# Patient Record
Sex: FEMALE | Race: WHITE | NOT HISPANIC OR LATINO | URBAN - METROPOLITAN AREA
[De-identification: names, ages, dates, MRNs, and addresses within clinical notes are randomized per-mention and may not be internally consistent; named-entity substitution may affect disease eponyms.]

---

## 2018-02-09 ENCOUNTER — NEW PATIENT (OUTPATIENT)
Dept: URBAN - METROPOLITAN AREA CLINIC 24 | Facility: CLINIC | Age: 73
End: 2018-02-09
Payer: MEDICARE

## 2018-02-09 DIAGNOSIS — H40.013 OPEN ANGLE WITH BORDERLINE FINDINGS, LOW RISK, BILATERAL: ICD-10-CM

## 2018-02-09 DIAGNOSIS — H25.813 COMBINED FORMS OF AGE-RELATED CATARACT, BILATERAL: ICD-10-CM

## 2018-02-09 DIAGNOSIS — H40.053 OCULAR HYPERTENSION, BILATERAL: Primary | ICD-10-CM

## 2018-02-09 PROCEDURE — 92083 EXTENDED VISUAL FIELD XM: CPT | Performed by: OPTOMETRIST

## 2018-02-09 PROCEDURE — 92133 CPTRZD OPH DX IMG PST SGM ON: CPT | Performed by: OPTOMETRIST

## 2018-02-09 PROCEDURE — 76514 ECHO EXAM OF EYE THICKNESS: CPT | Performed by: OPTOMETRIST

## 2018-02-09 PROCEDURE — 92004 COMPRE OPH EXAM NEW PT 1/>: CPT | Performed by: OPTOMETRIST

## 2018-02-09 ASSESSMENT — INTRAOCULAR PRESSURE
OS: 16
OD: 16

## 2018-02-09 ASSESSMENT — VISUAL ACUITY
OD: 20/25
OS: 20/20

## 2018-02-09 ASSESSMENT — KERATOMETRY
OS: 43.75
OD: 43.66

## 2018-03-12 ENCOUNTER — Encounter (OUTPATIENT)
Dept: URBAN - METROPOLITAN AREA CLINIC 24 | Facility: CLINIC | Age: 73
End: 2018-03-12
Payer: MEDICARE

## 2018-03-12 DIAGNOSIS — Z01.818 ENCOUNTER FOR OTHER PREPROCEDURAL EXAMINATION: Primary | ICD-10-CM

## 2018-03-12 PROCEDURE — 99213 OFFICE O/P EST LOW 20 MIN: CPT | Performed by: NURSE PRACTITIONER

## 2018-03-12 PROCEDURE — 92025 CPTRIZED CORNEAL TOPOGRAPHY: CPT | Performed by: OPHTHALMOLOGY

## 2018-03-12 RX ORDER — OMEPRAZOLE 20 MG/1
20 MG CAPSULE, DELAYED RELEASE ORAL
Qty: 0 | Refills: 0 | Status: ACTIVE
Start: 2018-03-12

## 2018-03-12 RX ORDER — LISINOPRIL 10 MG/1
10 MG TABLET ORAL
Qty: 0 | Refills: 0 | Status: ACTIVE
Start: 2018-03-12

## 2018-03-12 RX ORDER — RIVAROXABAN 20 MG/1
20 MG TABLET, FILM COATED ORAL
Qty: 0 | Refills: 0 | Status: ACTIVE
Start: 2018-03-12

## 2018-03-21 ENCOUNTER — FOLLOW UP ESTABLISHED (OUTPATIENT)
Dept: URBAN - METROPOLITAN AREA CLINIC 24 | Facility: CLINIC | Age: 73
End: 2018-03-21
Payer: MEDICARE

## 2018-03-21 PROCEDURE — 92012 INTRM OPH EXAM EST PATIENT: CPT | Performed by: OPHTHALMOLOGY

## 2018-03-21 ASSESSMENT — INTRAOCULAR PRESSURE
OD: 18
OS: 16

## 2021-01-15 DIAGNOSIS — Z23 NEED FOR VACCINATION: ICD-10-CM

## 2022-06-10 PROBLEM — M24.571 EQUINUS CONTRACTURE OF RIGHT ANKLE: Status: ACTIVE | Noted: 2022-06-10

## 2022-07-25 ENCOUNTER — PRE-ADMISSION TESTING (OUTPATIENT)
Dept: ADMISSIONS | Facility: MEDICAL CENTER | Age: 77
End: 2022-07-25
Attending: ORTHOPAEDIC SURGERY
Payer: MEDICARE

## 2022-07-25 DIAGNOSIS — Z01.812 PRE-OPERATIVE LABORATORY EXAMINATION: ICD-10-CM

## 2022-07-25 DIAGNOSIS — Z01.810 PRE-OPERATIVE CARDIOVASCULAR EXAMINATION: ICD-10-CM

## 2022-07-25 LAB
BASOPHILS # BLD AUTO: 0.8 % (ref 0–1.8)
BASOPHILS # BLD: 0.05 K/UL (ref 0–0.12)
EKG IMPRESSION: NORMAL
EOSINOPHIL # BLD AUTO: 0.07 K/UL (ref 0–0.51)
EOSINOPHIL NFR BLD: 1.2 % (ref 0–6.9)
ERYTHROCYTE [DISTWIDTH] IN BLOOD BY AUTOMATED COUNT: 44.9 FL (ref 35.9–50)
HCT VFR BLD AUTO: 43.4 % (ref 37–47)
HGB BLD-MCNC: 14.3 G/DL (ref 12–16)
IMM GRANULOCYTES # BLD AUTO: 0.02 K/UL (ref 0–0.11)
IMM GRANULOCYTES NFR BLD AUTO: 0.3 % (ref 0–0.9)
LYMPHOCYTES # BLD AUTO: 1.76 K/UL (ref 1–4.8)
LYMPHOCYTES NFR BLD: 29 % (ref 22–41)
MCH RBC QN AUTO: 29.7 PG (ref 27–33)
MCHC RBC AUTO-ENTMCNC: 32.9 G/DL (ref 33.6–35)
MCV RBC AUTO: 90 FL (ref 81.4–97.8)
MONOCYTES # BLD AUTO: 0.53 K/UL (ref 0–0.85)
MONOCYTES NFR BLD AUTO: 8.7 % (ref 0–13.4)
NEUTROPHILS # BLD AUTO: 3.63 K/UL (ref 2–7.15)
NEUTROPHILS NFR BLD: 60 % (ref 44–72)
NRBC # BLD AUTO: 0 K/UL
NRBC BLD-RTO: 0 /100 WBC
PLATELET # BLD AUTO: 263 K/UL (ref 164–446)
PMV BLD AUTO: 9.5 FL (ref 9–12.9)
RBC # BLD AUTO: 4.82 M/UL (ref 4.2–5.4)
WBC # BLD AUTO: 6.1 K/UL (ref 4.8–10.8)

## 2022-07-25 PROCEDURE — 93010 ELECTROCARDIOGRAM REPORT: CPT | Performed by: INTERNAL MEDICINE

## 2022-07-25 PROCEDURE — 36415 COLL VENOUS BLD VENIPUNCTURE: CPT

## 2022-07-25 PROCEDURE — 85025 COMPLETE CBC W/AUTO DIFF WBC: CPT

## 2022-07-25 PROCEDURE — 93005 ELECTROCARDIOGRAM TRACING: CPT

## 2022-08-03 ENCOUNTER — ANESTHESIA EVENT (OUTPATIENT)
Dept: SURGERY | Facility: MEDICAL CENTER | Age: 77
End: 2022-08-03
Payer: MEDICARE

## 2022-08-03 ENCOUNTER — HOSPITAL ENCOUNTER (OUTPATIENT)
Facility: MEDICAL CENTER | Age: 77
End: 2022-08-03
Attending: ORTHOPAEDIC SURGERY | Admitting: ORTHOPAEDIC SURGERY
Payer: MEDICARE

## 2022-08-03 ENCOUNTER — ANESTHESIA (OUTPATIENT)
Dept: SURGERY | Facility: MEDICAL CENTER | Age: 77
End: 2022-08-03
Payer: MEDICARE

## 2022-08-03 VITALS
RESPIRATION RATE: 13 BRPM | BODY MASS INDEX: 24.71 KG/M2 | OXYGEN SATURATION: 94 % | HEIGHT: 62 IN | SYSTOLIC BLOOD PRESSURE: 160 MMHG | WEIGHT: 134.26 LBS | TEMPERATURE: 96.9 F | DIASTOLIC BLOOD PRESSURE: 73 MMHG | HEART RATE: 78 BPM

## 2022-08-03 DIAGNOSIS — M79.671 RIGHT FOOT PAIN: ICD-10-CM

## 2022-08-03 DIAGNOSIS — M24.571 EQUINUS CONTRACTURE OF RIGHT ANKLE: ICD-10-CM

## 2022-08-03 DIAGNOSIS — Z86.73 HISTORY OF STROKE: ICD-10-CM

## 2022-08-03 LAB
ANION GAP SERPL CALC-SCNC: 14 MMOL/L (ref 7–16)
BUN SERPL-MCNC: 13 MG/DL (ref 8–22)
CALCIUM SERPL-MCNC: 9.7 MG/DL (ref 8.5–10.5)
CHLORIDE SERPL-SCNC: 106 MMOL/L (ref 96–112)
CO2 SERPL-SCNC: 20 MMOL/L (ref 20–33)
CREAT SERPL-MCNC: 0.67 MG/DL (ref 0.5–1.4)
GFR SERPLBLD CREATININE-BSD FMLA CKD-EPI: 91 ML/MIN/1.73 M 2
GLUCOSE SERPL-MCNC: 103 MG/DL (ref 65–99)
POTASSIUM SERPL-SCNC: 3.8 MMOL/L (ref 3.6–5.5)
SODIUM SERPL-SCNC: 140 MMOL/L (ref 135–145)

## 2022-08-03 PROCEDURE — 8968 PR NO CHARGE - PROCEDURE: Mod: ASROC | Performed by: SPECIALIST/TECHNOLOGIST, OTHER

## 2022-08-03 PROCEDURE — 700111 HCHG RX REV CODE 636 W/ 250 OVERRIDE (IP): Performed by: ANESTHESIOLOGY

## 2022-08-03 PROCEDURE — 160028 HCHG SURGERY MINUTES - 1ST 30 MINS LEVEL 3: Performed by: ORTHOPAEDIC SURGERY

## 2022-08-03 PROCEDURE — 160046 HCHG PACU - 1ST 60 MINS PHASE II: Performed by: ORTHOPAEDIC SURGERY

## 2022-08-03 PROCEDURE — 80048 BASIC METABOLIC PNL TOTAL CA: CPT

## 2022-08-03 PROCEDURE — 99100 ANES PT EXTEME AGE<1 YR&>70: CPT | Performed by: ANESTHESIOLOGY

## 2022-08-03 PROCEDURE — 700102 HCHG RX REV CODE 250 W/ 637 OVERRIDE(OP): Performed by: ANESTHESIOLOGY

## 2022-08-03 PROCEDURE — 700105 HCHG RX REV CODE 258: Performed by: ANESTHESIOLOGY

## 2022-08-03 PROCEDURE — 160048 HCHG OR STATISTICAL LEVEL 1-5: Performed by: ORTHOPAEDIC SURGERY

## 2022-08-03 PROCEDURE — 160036 HCHG PACU - EA ADDL 30 MINS PHASE I: Performed by: ORTHOPAEDIC SURGERY

## 2022-08-03 PROCEDURE — 01470 ANES PX NRV MSC LW L/A/F NOS: CPT | Performed by: ANESTHESIOLOGY

## 2022-08-03 PROCEDURE — A9270 NON-COVERED ITEM OR SERVICE: HCPCS | Performed by: ANESTHESIOLOGY

## 2022-08-03 PROCEDURE — 160025 RECOVERY II MINUTES (STATS): Performed by: ORTHOPAEDIC SURGERY

## 2022-08-03 PROCEDURE — 700105 HCHG RX REV CODE 258: Performed by: ORTHOPAEDIC SURGERY

## 2022-08-03 PROCEDURE — 27685 REVISION OF LOWER LEG TENDON: CPT | Mod: RT | Performed by: ORTHOPAEDIC SURGERY

## 2022-08-03 PROCEDURE — 700101 HCHG RX REV CODE 250: Performed by: ORTHOPAEDIC SURGERY

## 2022-08-03 PROCEDURE — 160035 HCHG PACU - 1ST 60 MINS PHASE I: Performed by: ORTHOPAEDIC SURGERY

## 2022-08-03 PROCEDURE — 700101 HCHG RX REV CODE 250: Performed by: ANESTHESIOLOGY

## 2022-08-03 PROCEDURE — 160009 HCHG ANES TIME/MIN: Performed by: ORTHOPAEDIC SURGERY

## 2022-08-03 PROCEDURE — 160002 HCHG RECOVERY MINUTES (STAT): Performed by: ORTHOPAEDIC SURGERY

## 2022-08-03 RX ORDER — LIDOCAINE HYDROCHLORIDE 20 MG/ML
INJECTION, SOLUTION EPIDURAL; INFILTRATION; INTRACAUDAL; PERINEURAL PRN
Status: DISCONTINUED | OUTPATIENT
Start: 2022-08-03 | End: 2022-08-03 | Stop reason: SURG

## 2022-08-03 RX ORDER — CEFAZOLIN SODIUM 1 G/3ML
INJECTION, POWDER, FOR SOLUTION INTRAMUSCULAR; INTRAVENOUS PRN
Status: DISCONTINUED | OUTPATIENT
Start: 2022-08-03 | End: 2022-08-03 | Stop reason: SURG

## 2022-08-03 RX ORDER — ONDANSETRON 2 MG/ML
4 INJECTION INTRAMUSCULAR; INTRAVENOUS
Status: COMPLETED | OUTPATIENT
Start: 2022-08-03 | End: 2022-08-03

## 2022-08-03 RX ORDER — DEXAMETHASONE SODIUM PHOSPHATE 4 MG/ML
INJECTION, SOLUTION INTRA-ARTICULAR; INTRALESIONAL; INTRAMUSCULAR; INTRAVENOUS; SOFT TISSUE PRN
Status: DISCONTINUED | OUTPATIENT
Start: 2022-08-03 | End: 2022-08-03 | Stop reason: SURG

## 2022-08-03 RX ORDER — DIPHENHYDRAMINE HYDROCHLORIDE 50 MG/ML
6.25 INJECTION INTRAMUSCULAR; INTRAVENOUS
Status: DISCONTINUED | OUTPATIENT
Start: 2022-08-03 | End: 2022-08-03 | Stop reason: HOSPADM

## 2022-08-03 RX ORDER — HALOPERIDOL 5 MG/ML
1 INJECTION INTRAMUSCULAR
Status: DISCONTINUED | OUTPATIENT
Start: 2022-08-03 | End: 2022-08-03 | Stop reason: HOSPADM

## 2022-08-03 RX ORDER — HYDROMORPHONE HYDROCHLORIDE 1 MG/ML
0.2 INJECTION, SOLUTION INTRAMUSCULAR; INTRAVENOUS; SUBCUTANEOUS
Status: DISCONTINUED | OUTPATIENT
Start: 2022-08-03 | End: 2022-08-03 | Stop reason: HOSPADM

## 2022-08-03 RX ORDER — ALBUTEROL SULFATE 2.5 MG/3ML
2.5 SOLUTION RESPIRATORY (INHALATION)
Status: DISCONTINUED | OUTPATIENT
Start: 2022-08-03 | End: 2022-08-03 | Stop reason: HOSPADM

## 2022-08-03 RX ORDER — BUPIVACAINE HYDROCHLORIDE 5 MG/ML
INJECTION, SOLUTION EPIDURAL; INTRACAUDAL
Status: DISCONTINUED | OUTPATIENT
Start: 2022-08-03 | End: 2022-08-03 | Stop reason: HOSPADM

## 2022-08-03 RX ORDER — SODIUM CHLORIDE, SODIUM LACTATE, POTASSIUM CHLORIDE, CALCIUM CHLORIDE 600; 310; 30; 20 MG/100ML; MG/100ML; MG/100ML; MG/100ML
INJECTION, SOLUTION INTRAVENOUS CONTINUOUS
Status: DISCONTINUED | OUTPATIENT
Start: 2022-08-03 | End: 2022-08-03

## 2022-08-03 RX ORDER — OXYCODONE HCL 5 MG/5 ML
10 SOLUTION, ORAL ORAL
Status: COMPLETED | OUTPATIENT
Start: 2022-08-03 | End: 2022-08-03

## 2022-08-03 RX ORDER — ONDANSETRON 2 MG/ML
INJECTION INTRAMUSCULAR; INTRAVENOUS PRN
Status: DISCONTINUED | OUTPATIENT
Start: 2022-08-03 | End: 2022-08-03 | Stop reason: SURG

## 2022-08-03 RX ORDER — SODIUM CHLORIDE, SODIUM LACTATE, POTASSIUM CHLORIDE, CALCIUM CHLORIDE 600; 310; 30; 20 MG/100ML; MG/100ML; MG/100ML; MG/100ML
INJECTION, SOLUTION INTRAVENOUS CONTINUOUS
Status: DISCONTINUED | OUTPATIENT
Start: 2022-08-03 | End: 2022-08-03 | Stop reason: HOSPADM

## 2022-08-03 RX ORDER — CEFAZOLIN SODIUM 1 G/3ML
2 INJECTION, POWDER, FOR SOLUTION INTRAMUSCULAR; INTRAVENOUS ONCE
Status: DISCONTINUED | OUTPATIENT
Start: 2022-08-03 | End: 2022-08-03 | Stop reason: HOSPADM

## 2022-08-03 RX ORDER — OXYCODONE HCL 5 MG/5 ML
5 SOLUTION, ORAL ORAL
Status: COMPLETED | OUTPATIENT
Start: 2022-08-03 | End: 2022-08-03

## 2022-08-03 RX ORDER — HYDROMORPHONE HYDROCHLORIDE 1 MG/ML
0.4 INJECTION, SOLUTION INTRAMUSCULAR; INTRAVENOUS; SUBCUTANEOUS
Status: DISCONTINUED | OUTPATIENT
Start: 2022-08-03 | End: 2022-08-03 | Stop reason: HOSPADM

## 2022-08-03 RX ORDER — HYDRALAZINE HYDROCHLORIDE 20 MG/ML
5 INJECTION INTRAMUSCULAR; INTRAVENOUS
Status: DISCONTINUED | OUTPATIENT
Start: 2022-08-03 | End: 2022-08-03 | Stop reason: HOSPADM

## 2022-08-03 RX ORDER — HYDROMORPHONE HYDROCHLORIDE 1 MG/ML
0.1 INJECTION, SOLUTION INTRAMUSCULAR; INTRAVENOUS; SUBCUTANEOUS
Status: DISCONTINUED | OUTPATIENT
Start: 2022-08-03 | End: 2022-08-03 | Stop reason: HOSPADM

## 2022-08-03 RX ADMIN — ONDANSETRON HYDROCHLORIDE 4 MG: 2 SOLUTION INTRAMUSCULAR; INTRAVENOUS at 08:03

## 2022-08-03 RX ADMIN — DEXAMETHASONE SODIUM PHOSPHATE 8 MG: 4 INJECTION, SOLUTION INTRA-ARTICULAR; INTRALESIONAL; INTRAMUSCULAR; INTRAVENOUS; SOFT TISSUE at 07:32

## 2022-08-03 RX ADMIN — FENTANYL CITRATE 50 MCG: 50 INJECTION, SOLUTION INTRAMUSCULAR; INTRAVENOUS at 07:27

## 2022-08-03 RX ADMIN — OXYCODONE HYDROCHLORIDE 10 MG: 5 SOLUTION ORAL at 08:04

## 2022-08-03 RX ADMIN — LIDOCAINE HYDROCHLORIDE 0.5 ML: 10 INJECTION, SOLUTION EPIDURAL; INFILTRATION; INTRACAUDAL; PERINEURAL at 06:23

## 2022-08-03 RX ADMIN — CEFAZOLIN 2 G: 330 INJECTION, POWDER, FOR SOLUTION INTRAMUSCULAR; INTRAVENOUS at 07:27

## 2022-08-03 RX ADMIN — SODIUM CHLORIDE, POTASSIUM CHLORIDE, SODIUM LACTATE AND CALCIUM CHLORIDE: 600; 310; 30; 20 INJECTION, SOLUTION INTRAVENOUS at 08:06

## 2022-08-03 RX ADMIN — LIDOCAINE HYDROCHLORIDE 50 MG: 20 INJECTION, SOLUTION EPIDURAL; INFILTRATION; INTRACAUDAL at 07:27

## 2022-08-03 RX ADMIN — PROPOFOL 150 MG: 10 INJECTION, EMULSION INTRAVENOUS at 07:27

## 2022-08-03 RX ADMIN — HYDRALAZINE HYDROCHLORIDE 5 MG: 20 INJECTION INTRAMUSCULAR; INTRAVENOUS at 08:31

## 2022-08-03 RX ADMIN — SODIUM CHLORIDE, POTASSIUM CHLORIDE, SODIUM LACTATE AND CALCIUM CHLORIDE: 600; 310; 30; 20 INJECTION, SOLUTION INTRAVENOUS at 06:24

## 2022-08-03 RX ADMIN — ONDANSETRON 4 MG: 2 INJECTION INTRAMUSCULAR; INTRAVENOUS at 07:40

## 2022-08-03 ASSESSMENT — PAIN DESCRIPTION - PAIN TYPE
TYPE: SURGICAL PAIN

## 2022-08-03 NOTE — ANESTHESIA TIME REPORT
Anesthesia Start and Stop Event Times     Date Time Event    8/3/2022 0719 Ready for Procedure     0723 Anesthesia Start     0747 Anesthesia Stop        Responsible Staff  08/03/22    Name Role Begin End    Bart Abdi M.D. Anesth 0723 0747        Overtime Reason:  no overtime (within assigned shift)    Comments:

## 2022-08-03 NOTE — OP REPORT
Date of operation: 8/3/2022    Service: Orthopaedic Surgery    Surgeon: Jaden Dean MD    Assistant: JUAN Caraballo    Preoperative Diagnosis: Right ankle equinus contracture    Post operative Diagnosis: same    Procedure Performed: Right achilles tendon lengthening    Complications: none    Specimens: none    Tourniquet Time: 3 minutes    Implants: none    Indication for procedure: The patient is a pleasant 76-year-old female with a history of a stroke and right-sided equinus contracture.  She was also found to have hyperextension of her knee secondary to her equinus contracture.  Operative and nonoperative treatment were discussed with the patient.  She elected for operative intervention. Risks of the procedure were discussed with the patient which include but not limited to bleeding, infection, damage to surrounding nerves, tendons, ligaments, other structures, need for future or revision surgery, continued pain, unsightly scar, dissatisfaction with outcome, DVT, stroke, myocardial infarction and even death.  Benefits include improved pain control and improved overall functional outcome.  The patient wished to proceed and the surgical consent forms were signed.    Description of Procedure: On the date of surgery the patient was met in the preoperative area where the operative extremity was identified by myself, confirmed the patient, marked with my initials.  The patient was then brought back to the operating room, placed supine on the operating table.  Laryngal mask airway anesthesia was undertaken without incident.  A nonsterile thigh tourniquet was placed on the operative thigh, SCD on the contralateral lower extremity.  Operative extremity was prepped and draped in the usual sterile fashion.  Multidisciplinary timeout was performed confirm the correct site, correct patient, correct procedure. The patient received ancef within 30 minutes of incision. Operative extremity was then elevated  for approximately 2 minutes time and the tourniquet was brought to 250mmHg    We began with 3 percutaneous hemisections of the Achilles tendon, beginning distal medial then proximal more lateral than more proximal medial.  After performing these incisions and the 3 hemisections of the Achilles tendon we were then able to dorsiflex the ankle to a neutral position.  There is found to be no significant plantarflexion nor inversion of the posterior medial tendons.  The foot was found to be in a plantigrade and flexible position.  The Zheng test was found to be negative.  Given this we did not perform a posterior medial release.  At this point the wounds were copiously irrigated.  The wounds were anesthetized.  The wounds were closed with interrupted nylon sutures.  Patient was awoken from anesthesia, moved onto the postoperative gurney and into PACU in stable condition      Post-Operative Plan: The patient will be weightbearing as tolerated on the right lower extremity in a boot for 6 weeks postsurgically.  She will come back to the office in 2 weeks at which point sutures will be removed and she will continue with weightbearing as tolerated in therapy

## 2022-08-03 NOTE — OR NURSING
Patient awake and alert and was able to ambulate to the restroom with assistance. VSS. Dressing to right ankle is CDI and pt has boot in place. Pt niece is in the room and both have been given discharge instructions and rx information.Pt and niece have verbalized understanding. Pt has all belongings with her and IV has been removed.

## 2022-08-03 NOTE — ANESTHESIA PREPROCEDURE EVALUATION
Case: [id number] Date/Time: 08/03/22 0715    Procedures:       RIGHT ACHILLES TENDON LENGTHENING, POSTEROMEDIAL RELEASE, POSSIBLE FLEXOR DIGITORUM LONGUS/FLEXOR HALLUCIS LONGUS POSTERIOR TIBIAL TENDON LENGTHENING, POSSIBLE DISTAL TIBIA PARTIAL EXCISION (Right )      OSTEOTOMY      REPAIR, TENDON, FLEXOR, PRIMARY, WITHOUT USING GRAFT    Diagnosis: Equinus contracture of right ankle [M24.571]    Pre-op diagnosis: Equinus contracture of right ankle [M24.571]    Location: [place] [address] / SURGERY [facility]    Surgeons: Jaden Dean M.D.          Relevant Problems   NEURO   (positive) History of CVA (cerebrovascular accident)      CARDIAC   (positive) Brain aneurysm s/p coil   (positive) CAD (coronary artery disease)   (positive) Hemorrhoids      GI   (positive) Gastroesophageal reflux disease   (positive) Hiatal hernia      ENDO   (positive) Hypothyroid      Other   (positive) Equinus contracture of right ankle       Physical Exam    Airway   Mallampati: II  TM distance: >3 FB  Neck ROM: full       Cardiovascular - normal exam  Rhythm: regular  Rate: normal  (-) murmur     Dental - normal exam           Pulmonary - normal exam  Breath sounds clear to auscultation     Abdominal    Neurological - normal exam                 Anesthesia Plan    ASA 3   ASA physical status 3 criteria: CVA or TIA - history (> 3 months)    Plan - general       Airway plan will be LMA          Induction: intravenous    Postoperative Plan: Postoperative administration of opioids is intended.    Pertinent diagnostic labs and testing reviewed    Informed Consent:    Anesthetic plan and risks discussed with patient.

## 2022-08-03 NOTE — DISCHARGE INSTRUCTIONS
HOME CARE INSTRUCTIONS    ACTIVITY: Rest and take it easy for the first 24 hours.  A responsible adult is recommended to remain with you during that time.  It is normal to feel sleepy.  We encourage you to not do anything that requires balance, judgment or coordination.    FOR 24 HOURS DO NOT:  Drive, operate machinery or run household appliances.  Drink beer or alcoholic beverages.  Make important decisions or sign legal documents.    SPECIAL INSTRUCTIONS: Refer to TANK packet for instructions    DIET: To avoid nausea, slowly advance diet as tolerated, avoiding spicy or greasy foods for the first day.  Add more substantial food to your diet according to your physician's instructions.  Babies can be fed formula or breast milk as soon as they are hungry.  INCREASE FLUIDS AND FIBER TO AVOID CONSTIPATION.    SURGICAL DRESSING/BATHING: Keep dressing clean and dry    MEDICATIONS: Resume taking daily medication.  Take prescribed pain medication with food.  If no medication is prescribed, you may take non-aspirin pain medication if needed.  PAIN MEDICATION CAN BE VERY CONSTIPATING.  Take a stool softener or laxative such as senokot, pericolace, or milk of magnesia if needed.    Prescription sent to your pharmacy.  Last pain medication given at 0804.    A follow-up appointment should be arranged with your doctor; call to schedule.    You should CALL YOUR PHYSICIAN if you develop:  Fever greater than 101 degrees F.  Pain not relieved by medication, or persistent nausea or vomiting.  Excessive bleeding (blood soaking through dressing) or unexpected drainage from the wound.  Extreme redness or swelling around the incision site, drainage of pus or foul smelling drainage.  Inability to urinate or empty your bladder within 8 hours.  Problems with breathing or chest pain.    You should call 911 if you develop problems with breathing or chest pain.  If you are unable to contact your doctor or surgical center, you should go to the  nearest emergency room or urgent care center.  Physician's telephone #: [phone]    MILD FLU-LIKE SYMPTOMS ARE NORMAL.  YOU MAY EXPERIENCE GENERALIZED MUSCLE ACHES, THROAT IRRITATION, HEADACHE AND/OR SOME NAUSEA.    If any questions arise, call your doctor.  If your doctor is not available, please feel free to call the Surgical Center at [phone].  The Center is open Monday through Friday from 7AM to 7PM.      A registered nurse may call you a few days after your surgery to see how you are doing after your procedure.    You may also receive a survey in the mail within the next two weeks and we ask that you take a few moments to complete the survey and return it to us.  Our goal is to provide you with very good care and we value your comments.     Depression / Suicide Risk    As you are discharged from this [facility] facility, it is important to learn how to keep safe from harming yourself.    Recognize the warning signs:  Abrupt changes in personality, positive or negative- including increase in energy   Giving away possessions  Change in eating patterns- significant weight changes-  positive or negative  Change in sleeping patterns- unable to sleep or sleeping all the time   Unwillingness or inability to communicate  Depression  Unusual sadness, discouragement and loneliness  Talk of wanting to die  Neglect of personal appearance   Rebelliousness- reckless behavior  Withdrawal from people/activities they love  Confusion- inability to concentrate     If you or a loved one observes any of these behaviors or has concerns about self-harm, here's what you can do:  Talk about it- your feelings and reasons for harming yourself  Remove any means that you might use to hurt yourself (examples: pills, rope, extension cords, firearm)  Get professional help from the community (Mental Health, Substance Abuse, psychological counseling)  Do not be alone:Call your Safe Contact- someone whom you trust who will be there  for you.  Call your local CRISIS HOTLINE 683-0220 or 268-812-9160  Call your local Children's Mobile Crisis Response Team [place] (582) 224-7959 or www.CafeMom  Call the toll free National Suicide Prevention Hotlines   National Suicide Prevention Lifeline 146-865-PUNR (0436)  [facility] Line Network 800-SUICIDE (679-8119)    I acknowledge receipt and understanding of these Home Care instructions.

## 2022-08-03 NOTE — OR NURSING
0746 pt arrived from OR via gurney. Report given by anesthesia and RN. 97.6 sleeping, perrla, 6L mask, even non labored breathing, lungs clear airway patent. SR. Skin pink warm dry. PIV patent. RLE dressing cdi, no drainage, no hematoma. toes cool, brisk cap refill, pink, elevated, cold pack. Glasses in chart    0750 arousable, gag reflex intact, opa removed, spontaneous even non labored breathing.     0803, 0804 awake clear speech, follows commands, right side facial droop (baseline post cva), c/o pain and nausea, treated per mar    0810 RLE unable to wiggles toes sec to prior cva (baseline). Full sensation intact, pink warm brisk cap refill.     0815 resting comfortably with eyes closed, even non labored breathing, skin pink warm dry. Face relaxed.     0831 treated for htn per mar    0832 pt tolerates sips of water    0845 resting comfortably, even non labored breathing. RLE dressing cdi, no drainage, no hematoma.     0852 traction team at bedside. Walking boot placed.     0858 updated Eva, niece. O2 tank full for transfer.     0900 awake alert, denies pain and nausea.     0906 report given to Mignon BAUTISTA, phase 2, rm 32 ready for transfer to phase 2

## 2022-08-03 NOTE — ANESTHESIA POSTPROCEDURE EVALUATION
Patient: Abbi King    Procedure Summary     Date: 08/03/22 Room / Location: [place] [address] / SURGERY [facility]    Anesthesia Start: 0723 Anesthesia Stop: 0747    Procedure: RIGHT ACHILLES TENDON LENGTHENING (Right Ankle) Diagnosis:       Equinus contracture of right ankle      (Equinus contracture of right ankle [M24.571])    Surgeons: Jaden Dean M.D. Responsible Provider: Bart Abdi M.D.    Anesthesia Type: general ASA Status: 3          Final Anesthesia Type: general  Last vitals  BP   Blood Pressure : (!) 160/73    Temp   36.4 °C (97.5 °F)    Pulse   89   Resp   15    SpO2   95 %      Anesthesia Post Evaluation    Patient location during evaluation: PACU  Patient participation: complete - patient participated  Level of consciousness: awake and alert    Airway patency: patent  Anesthetic complications: no  Cardiovascular status: hemodynamically stable  Respiratory status: acceptable  Hydration status: euvolemic    PONV: none          No complications documented.     Nurse Pain Score: 0 (NPRS)

## 2022-08-03 NOTE — ANESTHESIA PROCEDURE NOTES
Airway    Date/Time: 8/3/2022 7:27 AM  Performed by: Bart Abdi M.D.  Authorized by: Bart Abdi M.D.     Location:  OR  Urgency:  Elective  Difficult Airway: No    Indications for Airway Management:  Anesthesia      Spontaneous Ventilation: absent    Sedation Level:  Deep  Preoxygenated: Yes    Mask Difficulty Assessment:  0 - not attempted  Final Airway Type:  Supraglottic airway  Final Supraglottic Airway:  Standard LMA    SGA Size:  3  Number of Attempts at Approach:  1

## 2022-08-03 NOTE — LETTER
June 27, 2022    Patient Name: Abbi King  Surgeon Name: Jaden Dean M.D.  Surgery Facility: [facility] ([address] [place] [address])  Surgery Date: 7/25/2022    The time of your surgery is not final and may change up to and until the day of your surgery. You will be contacted 24-48 hours prior to your surgery date with your check-in and surgery time.    If you will not be at one of the below numbers please call/text the surgery scheduler at [phone]  Preferred Phone: [phone]    BEFORE YOUR SURGERY   Pre Registration and/or Lab Work must be done within and no earlier than 28 days prior to your surgery date. Please call [facility] at [phone] for an appointment as soon as possible.    COVID testing is not required.    Please refrain from smoking any substance after midnight prior to surgery. Smoking may interfere with the anesthetic and frequently produces nausea during the recovery period.    Continue taking all lifesaving medications. Including the morning of your surgery with small sip of water.    Please read the MEDICATION INSTRUCTIONS below completely.    DAY OF YOUR SURGERY  Nothing to eat or drink after midnight     Please arrive at the hospital/surgery center at the check-in time provided.     An adult will need to bring you and take you home after your surgery.     AFTER YOUR SURGERY  Post op Appointment:   Date: 08.16.22   Time: 9:30 AM   With: Jaden Dean M.D.   Location: [address] [place] [id number]    - Post Surgery - You will need someone to drive you home  - Post Surgery - You will need someone to stay with you for 24 hours  - You must have someone provide transportation post surgery and someone to monitor you for at least 24 hours post-surgery. If you don't have either of these your appointment will be canceled.         TIME OFF WORK  FMLA or Disability forms can be faxed directly to: [phone] or you may drop them off at [address] [place] [address]. Our office charges a $35.00 fee per form. Forms will be completed within 10 business days of drop off and payment received. For the status of your forms you may contact our disability office directly [phone].    MEDICATION INSTRUCTIONS  The following medications should be stopped a minimum of 10 days prior to surgery:  All over the counter, Supplements & Herbal medications    Anorectics: Phentermine (Adipex-P, Lomaira and Suprenza), Phentermine-topiramate (Qsymia), Bupropion-naltrexone (Contrave)    Opiod Partial Agonists/Opioid Antagonists: Buprenorphine (Subocone, Belbuca, Butrans, Probuphine Implant, Sublocade), Naltrexone (ReVia, Vivitrol), Naloxone    Amphetamines: Dextroamphetamine/Amphetamine (Adderall, Mydayis), Methylphenidate Hydrochloride (Concerta, Metadate, Methylin, Ritalin)    The following medications should be stopped 5 days prior to surgery:  Blood Thinners: Any Aspirin, Aspirin products, anti-inflammatories such as ibuprofen and any blood thinners such as Coumadin and Plavix. Please consult your prescribing physician if you are on life saving blood thinners, in regards to when to stop medications prior to surgery.     The following medications should be stopped a minimum of 3 days prior to surgery:  PDE-5 inhibitors: Sildenafil (Viagra), Tadalafil (Cialis), Vardenafil (Levitra), Avanafil (Stendra)    MAO Inhibitors: Rasagiline (Azilect), Selegiline (Eldepryl, Emsam, Selapar), Isocarboxazid (Marplan), Phenelzine (Nardil)

## 2022-08-03 NOTE — H&P
Surgery Orthopedic History & Physical Note    Date  8/3/2022    Primary Care Physician  Pcp Pt States None    CC  Pre-Op Diagnosis Codes:     * Equinus contracture of right ankle [M24.571]    HPI  This is a 76 y.o. female who presented with right equinovarus contracture following stroke.    Past Medical History:   Diagnosis Date   • Arrhythmia 2009    hx of AFIB   • Brain aneurysm s/p coil 2010   • CAD (coronary artery disease) 2012   • Dyslipidemia 2012   • Hemorrhagic disorder (HCC)    • History of CVA (cerebrovascular accident) 2010    pt has right side weakness/upper ext. flaccid   • Hypothyroid 2012   • Indigestion     GERD   • Right sided weakness 2010   • Stroke (HCC)        Past Surgical History:   Procedure Laterality Date   • ABDOMINAL HYSTERECTOMY TOTAL     • OTHER      coil aneurysm   • TONSILLECTOMY     • ZZZ EP/ABLATION      cardiac ablation x2       Current Facility-Administered Medications   Medication Dose Route Frequency Provider Last Rate Last Admin   • ceFAZolin (ANCEF) injection 2 g  2 g Intravenous Once Jaden Dean M.D.       • lidocaine (XYLOCAINE) 1 % injection 0.5 mL  0.5 mL Intradermal Once PRN Jaden Dean M.D.   0.5 mL at 22 0623   • lactated ringers infusion   Intravenous Continuous Jaden Dean M.D. 10 mL/hr at 22 0624 New Bag at 22 0624       Social History     Socioeconomic History   • Marital status:      Spouse name: Not on file   • Number of children: Not on file   • Years of education: Not on file   • Highest education level: Not on file   Occupational History   • Not on file   Tobacco Use   • Smoking status: Former Smoker     Packs/day: 1.00     Years: 20.00     Pack years: 20.00     Quit date: 6/3/1974     Years since quittin.2   • Smokeless tobacco: Never Used   • Tobacco comment: quit 20 years ago smoked for 20 years less then pack u day   Vaping Use   • Vaping Use: Never  used   Substance and Sexual Activity   • Alcohol use: Yes     Comment: drink every other day   • Drug use: No   • Sexual activity: Yes     Partners: Male     Comment:  /can do all ADLs by herself   Other Topics Concern   • Not on file   Social History Narrative   • Not on file     Social Determinants of Health     Financial Resource Strain: Not on file   Food Insecurity: Not on file   Transportation Needs: Not on file   Physical Activity: Not on file   Stress: Not on file   Social Connections: Not on file   Intimate Partner Violence: Not on file   Housing Stability: Not on file       Family History   Problem Relation Age of Onset   • Cancer Mother         lung   • Heart Disease Mother    • Heart Attack Mother    • Thyroid Mother    • Other Brother        Allergies  Darvocet [propoxyphene n-apap] and Demerol    Review of Systems  Negative    Physical Exam    Vital Signs  Blood Pressure : (!) 165/74   Temperature: 36.3 °C (97.4 °F)   Pulse: 75   Respiration: 16   Pulse Oximetry: 96 %     Equinus contracture with varus pull of posteromedial ankle tendons  Palpable pulse  Sensation intact right foot  Labs:                    Radiology:  No orders to display         Assessment/Plan:  Pre-Op Diagnosis Codes:     * Equinus contracture of right ankle [M24.571]  Procedure(s):  RIGHT ACHILLES TENDON LENGTHENING, POSTEROMEDIAL RELEASE, POSSIBLE FLEXOR DIGITORUM LONGUS/FLEXOR HALLUCIS LONGUS POSTERIOR TIBIAL TENDON LENGTHENING, POSSIBLE DISTAL TIBIA PARTIAL EXCISION  OSTEOTOMY  REPAIR, TENDON, FLEXOR, PRIMARY, WITHOUT USING GRAFT

## 2022-09-13 PROBLEM — M79.671 RIGHT FOOT PAIN: Status: ACTIVE | Noted: 2022-09-13

## 2022-09-22 ENCOUNTER — HOSPITAL ENCOUNTER (OUTPATIENT)
Facility: MEDICAL CENTER | Age: 77
End: 2022-09-22
Attending: ANESTHESIOLOGY
Payer: MEDICARE

## 2022-09-22 LAB — COVID ORDER STATUS COVID19: NORMAL

## 2022-09-22 PROCEDURE — U0003 INFECTIOUS AGENT DETECTION BY NUCLEIC ACID (DNA OR RNA); SEVERE ACUTE RESPIRATORY SYNDROME CORONAVIRUS 2 (SARS-COV-2) (CORONAVIRUS DISEASE [COVID-19]), AMPLIFIED PROBE TECHNIQUE, MAKING USE OF HIGH THROUGHPUT TECHNOLOGIES AS DESCRIBED BY CMS-2020-01-R: HCPCS

## 2022-09-22 PROCEDURE — U0005 INFEC AGEN DETEC AMPLI PROBE: HCPCS

## 2022-09-23 LAB
SARS-COV-2 RNA RESP QL NAA+PROBE: NOTDETECTED
SPECIMEN SOURCE: NORMAL

## 2022-11-11 ENCOUNTER — PATIENT MESSAGE (OUTPATIENT)
Dept: HEALTH INFORMATION MANAGEMENT | Facility: OTHER | Age: 77
End: 2022-11-11

## 2023-04-24 ENCOUNTER — APPOINTMENT (OUTPATIENT)
Dept: RADIOLOGY | Facility: MEDICAL CENTER | Age: 78
End: 2023-04-24
Attending: EMERGENCY MEDICINE
Payer: MEDICARE

## 2023-04-24 ENCOUNTER — APPOINTMENT (OUTPATIENT)
Dept: RADIOLOGY | Facility: MEDICAL CENTER | Age: 78
End: 2023-04-24
Attending: HOSPITALIST
Payer: MEDICARE

## 2023-04-24 ENCOUNTER — HOSPITAL ENCOUNTER (OUTPATIENT)
Facility: MEDICAL CENTER | Age: 78
End: 2023-04-25
Attending: EMERGENCY MEDICINE | Admitting: HOSPITALIST
Payer: MEDICARE

## 2023-04-24 DIAGNOSIS — N28.89 RENAL MASS: ICD-10-CM

## 2023-04-24 DIAGNOSIS — R16.0 LIVER MASS: ICD-10-CM

## 2023-04-24 DIAGNOSIS — K62.5 RECTAL BLEEDING: ICD-10-CM

## 2023-04-24 DIAGNOSIS — I67.1 BRAIN ANEURYSM: ICD-10-CM

## 2023-04-24 DIAGNOSIS — Z86.73 HISTORY OF CVA (CEREBROVASCULAR ACCIDENT): ICD-10-CM

## 2023-04-24 PROBLEM — K92.2 GIB (GASTROINTESTINAL BLEEDING): Status: ACTIVE | Noted: 2023-04-24

## 2023-04-24 PROBLEM — Z66 DNR (DO NOT RESUSCITATE): Status: ACTIVE | Noted: 2023-04-24

## 2023-04-24 LAB
ABO + RH BLD: NORMAL
ABO GROUP BLD: NORMAL
ALBUMIN SERPL BCP-MCNC: 4.2 G/DL (ref 3.2–4.9)
ALBUMIN/GLOB SERPL: 1.8 G/DL
ALP SERPL-CCNC: 74 U/L (ref 30–99)
ALT SERPL-CCNC: 9 U/L (ref 2–50)
ANION GAP SERPL CALC-SCNC: 12 MMOL/L (ref 7–16)
APTT PPP: 30.3 SEC (ref 24.7–36)
APTT PPP: 32.7 SEC (ref 24.7–36)
AST SERPL-CCNC: 12 U/L (ref 12–45)
BASOPHILS # BLD AUTO: 0.4 % (ref 0–1.8)
BASOPHILS # BLD: 0.03 K/UL (ref 0–0.12)
BILIRUB SERPL-MCNC: 0.3 MG/DL (ref 0.1–1.5)
BLD GP AB SCN SERPL QL: NORMAL
BUN SERPL-MCNC: 20 MG/DL (ref 8–22)
CALCIUM ALBUM COR SERPL-MCNC: 9 MG/DL (ref 8.5–10.5)
CALCIUM SERPL-MCNC: 9.2 MG/DL (ref 8.4–10.2)
CHLORIDE SERPL-SCNC: 105 MMOL/L (ref 96–112)
CO2 SERPL-SCNC: 24 MMOL/L (ref 20–33)
CREAT SERPL-MCNC: 0.72 MG/DL (ref 0.5–1.4)
EKG IMPRESSION: NORMAL
EOSINOPHIL # BLD AUTO: 0.05 K/UL (ref 0–0.51)
EOSINOPHIL NFR BLD: 0.7 % (ref 0–6.9)
ERYTHROCYTE [DISTWIDTH] IN BLOOD BY AUTOMATED COUNT: 45.1 FL (ref 35.9–50)
GFR SERPLBLD CREATININE-BSD FMLA CKD-EPI: 86 ML/MIN/1.73 M 2
GLOBULIN SER CALC-MCNC: 2.3 G/DL (ref 1.9–3.5)
GLUCOSE SERPL-MCNC: 121 MG/DL (ref 65–99)
HCT VFR BLD AUTO: 32.9 % (ref 37–47)
HGB BLD-MCNC: 10.8 G/DL (ref 12–16)
HGB BLD-MCNC: 8.7 G/DL (ref 12–16)
IMM GRANULOCYTES # BLD AUTO: 0.02 K/UL (ref 0–0.11)
IMM GRANULOCYTES NFR BLD AUTO: 0.3 % (ref 0–0.9)
INR PPP: 1.67 (ref 0.87–1.13)
INR PPP: 2.33 (ref 0.87–1.13)
LACTATE SERPL-SCNC: 1.7 MMOL/L (ref 0.5–2)
LIPASE SERPL-CCNC: 30 U/L (ref 7–58)
LYMPHOCYTES # BLD AUTO: 1.94 K/UL (ref 1–4.8)
LYMPHOCYTES NFR BLD: 26 % (ref 22–41)
MCH RBC QN AUTO: 29.6 PG (ref 27–33)
MCHC RBC AUTO-ENTMCNC: 32.8 G/DL (ref 33.6–35)
MCV RBC AUTO: 90.1 FL (ref 81.4–97.8)
MONOCYTES # BLD AUTO: 0.55 K/UL (ref 0–0.85)
MONOCYTES NFR BLD AUTO: 7.4 % (ref 0–13.4)
NEUTROPHILS # BLD AUTO: 4.88 K/UL (ref 2–7.15)
NEUTROPHILS NFR BLD: 65.2 % (ref 44–72)
NRBC # BLD AUTO: 0 K/UL
NRBC BLD-RTO: 0 /100 WBC
NT-PROBNP SERPL IA-MCNC: 66 PG/ML (ref 0–125)
PLATELET # BLD AUTO: 255 K/UL (ref 164–446)
PMV BLD AUTO: 9.2 FL (ref 9–12.9)
POTASSIUM SERPL-SCNC: 4.4 MMOL/L (ref 3.6–5.5)
PROT SERPL-MCNC: 6.5 G/DL (ref 6–8.2)
PROTHROMBIN TIME: 19.4 SEC (ref 12–14.6)
PROTHROMBIN TIME: 24.9 SEC (ref 12–14.6)
RBC # BLD AUTO: 3.65 M/UL (ref 4.2–5.4)
RH BLD: NORMAL
SODIUM SERPL-SCNC: 141 MMOL/L (ref 135–145)
TROPONIN T SERPL-MCNC: <6 NG/L (ref 6–19)
TSH SERPL DL<=0.005 MIU/L-ACNC: 2.63 UIU/ML (ref 0.38–5.33)
WBC # BLD AUTO: 7.5 K/UL (ref 4.8–10.8)

## 2023-04-24 PROCEDURE — 87040 BLOOD CULTURE FOR BACTERIA: CPT

## 2023-04-24 PROCEDURE — 80053 COMPREHEN METABOLIC PANEL: CPT

## 2023-04-24 PROCEDURE — 96375 TX/PRO/DX INJ NEW DRUG ADDON: CPT | Mod: XU

## 2023-04-24 PROCEDURE — 85384 FIBRINOGEN ACTIVITY: CPT

## 2023-04-24 PROCEDURE — 99223 1ST HOSP IP/OBS HIGH 75: CPT | Mod: AI | Performed by: HOSPITALIST

## 2023-04-24 PROCEDURE — 85610 PROTHROMBIN TIME: CPT

## 2023-04-24 PROCEDURE — 85347 COAGULATION TIME ACTIVATED: CPT

## 2023-04-24 PROCEDURE — 84484 ASSAY OF TROPONIN QUANT: CPT

## 2023-04-24 PROCEDURE — 94760 N-INVAS EAR/PLS OXIMETRY 1: CPT

## 2023-04-24 PROCEDURE — 96374 THER/PROPH/DIAG INJ IV PUSH: CPT | Mod: XU

## 2023-04-24 PROCEDURE — 99285 EMERGENCY DEPT VISIT HI MDM: CPT

## 2023-04-24 PROCEDURE — 700105 HCHG RX REV CODE 258: Performed by: HOSPITALIST

## 2023-04-24 PROCEDURE — 86901 BLOOD TYPING SEROLOGIC RH(D): CPT

## 2023-04-24 PROCEDURE — 86900 BLOOD TYPING SEROLOGIC ABO: CPT

## 2023-04-24 PROCEDURE — G0378 HOSPITAL OBSERVATION PER HR: HCPCS

## 2023-04-24 PROCEDURE — 85018 HEMOGLOBIN: CPT | Mod: XU

## 2023-04-24 PROCEDURE — 85730 THROMBOPLASTIN TIME PARTIAL: CPT

## 2023-04-24 PROCEDURE — 84443 ASSAY THYROID STIM HORMONE: CPT

## 2023-04-24 PROCEDURE — 74177 CT ABD & PELVIS W/CONTRAST: CPT

## 2023-04-24 PROCEDURE — C9113 INJ PANTOPRAZOLE SODIUM, VIA: HCPCS | Performed by: EMERGENCY MEDICINE

## 2023-04-24 PROCEDURE — 85025 COMPLETE CBC W/AUTO DIFF WBC: CPT

## 2023-04-24 PROCEDURE — 85576 BLOOD PLATELET AGGREGATION: CPT

## 2023-04-24 PROCEDURE — 700111 HCHG RX REV CODE 636 W/ 250 OVERRIDE (IP): Performed by: HOSPITALIST

## 2023-04-24 PROCEDURE — 83605 ASSAY OF LACTIC ACID: CPT

## 2023-04-24 PROCEDURE — 71045 X-RAY EXAM CHEST 1 VIEW: CPT

## 2023-04-24 PROCEDURE — 700105 HCHG RX REV CODE 258: Performed by: EMERGENCY MEDICINE

## 2023-04-24 PROCEDURE — 83880 ASSAY OF NATRIURETIC PEPTIDE: CPT

## 2023-04-24 PROCEDURE — 36415 COLL VENOUS BLD VENIPUNCTURE: CPT | Mod: XU

## 2023-04-24 PROCEDURE — 770020 HCHG ROOM/CARE - TELE (206)

## 2023-04-24 PROCEDURE — 86850 RBC ANTIBODY SCREEN: CPT

## 2023-04-24 PROCEDURE — 74174 CTA ABD&PLVS W/CONTRAST: CPT

## 2023-04-24 PROCEDURE — 36415 COLL VENOUS BLD VENIPUNCTURE: CPT

## 2023-04-24 PROCEDURE — 93005 ELECTROCARDIOGRAM TRACING: CPT | Performed by: EMERGENCY MEDICINE

## 2023-04-24 PROCEDURE — 700111 HCHG RX REV CODE 636 W/ 250 OVERRIDE (IP): Performed by: EMERGENCY MEDICINE

## 2023-04-24 PROCEDURE — 700117 HCHG RX CONTRAST REV CODE 255: Performed by: EMERGENCY MEDICINE

## 2023-04-24 PROCEDURE — 83690 ASSAY OF LIPASE: CPT

## 2023-04-24 PROCEDURE — 700117 HCHG RX CONTRAST REV CODE 255: Performed by: HOSPITALIST

## 2023-04-24 RX ORDER — NICOTINE POLACRILEX 4 MG/1
20 GUM, CHEWING ORAL EVERY EVENING
COMMUNITY

## 2023-04-24 RX ORDER — ONDANSETRON 4 MG/1
4 TABLET, ORALLY DISINTEGRATING ORAL EVERY 4 HOURS PRN
Status: DISCONTINUED | OUTPATIENT
Start: 2023-04-24 | End: 2023-04-25 | Stop reason: HOSPADM

## 2023-04-24 RX ORDER — OXYCODONE HYDROCHLORIDE 5 MG/1
5 TABLET ORAL
Status: DISCONTINUED | OUTPATIENT
Start: 2023-04-24 | End: 2023-04-25 | Stop reason: HOSPADM

## 2023-04-24 RX ORDER — ACETAMINOPHEN 500 MG
1000 TABLET ORAL EVERY 6 HOURS PRN
COMMUNITY
End: 2023-11-17

## 2023-04-24 RX ORDER — PANTOPRAZOLE SODIUM 40 MG/10ML
40 INJECTION, POWDER, LYOPHILIZED, FOR SOLUTION INTRAVENOUS ONCE
Status: COMPLETED | OUTPATIENT
Start: 2023-04-24 | End: 2023-04-24

## 2023-04-24 RX ORDER — ONDANSETRON 2 MG/ML
4 INJECTION INTRAMUSCULAR; INTRAVENOUS EVERY 4 HOURS PRN
Status: DISCONTINUED | OUTPATIENT
Start: 2023-04-24 | End: 2023-04-25 | Stop reason: HOSPADM

## 2023-04-24 RX ORDER — SODIUM CHLORIDE 9 MG/ML
1000 INJECTION, SOLUTION INTRAVENOUS ONCE
Status: COMPLETED | OUTPATIENT
Start: 2023-04-24 | End: 2023-04-24

## 2023-04-24 RX ORDER — MORPHINE SULFATE 4 MG/ML
4 INJECTION INTRAVENOUS
Status: DISCONTINUED | OUTPATIENT
Start: 2023-04-24 | End: 2023-04-25 | Stop reason: HOSPADM

## 2023-04-24 RX ORDER — LABETALOL HYDROCHLORIDE 5 MG/ML
10 INJECTION, SOLUTION INTRAVENOUS EVERY 4 HOURS PRN
Status: DISCONTINUED | OUTPATIENT
Start: 2023-04-24 | End: 2023-04-25 | Stop reason: HOSPADM

## 2023-04-24 RX ORDER — PANTOPRAZOLE SODIUM 40 MG/10ML
40 INJECTION, POWDER, LYOPHILIZED, FOR SOLUTION INTRAVENOUS 2 TIMES DAILY
Status: DISCONTINUED | OUTPATIENT
Start: 2023-04-24 | End: 2023-04-24

## 2023-04-24 RX ORDER — PANTOPRAZOLE SODIUM 40 MG/10ML
40 INJECTION, POWDER, LYOPHILIZED, FOR SOLUTION INTRAVENOUS 2 TIMES DAILY
Status: DISCONTINUED | OUTPATIENT
Start: 2023-04-25 | End: 2023-04-25 | Stop reason: HOSPADM

## 2023-04-24 RX ORDER — OXYCODONE HYDROCHLORIDE 10 MG/1
10 TABLET ORAL
Status: DISCONTINUED | OUTPATIENT
Start: 2023-04-24 | End: 2023-04-25 | Stop reason: HOSPADM

## 2023-04-24 RX ORDER — SODIUM CHLORIDE 9 MG/ML
INJECTION, SOLUTION INTRAVENOUS CONTINUOUS
Status: DISCONTINUED | OUTPATIENT
Start: 2023-04-24 | End: 2023-04-25 | Stop reason: HOSPADM

## 2023-04-24 RX ADMIN — SODIUM CHLORIDE: 9 INJECTION, SOLUTION INTRAVENOUS at 21:22

## 2023-04-24 RX ADMIN — IOHEXOL 100 ML: 350 INJECTION, SOLUTION INTRAVENOUS at 15:26

## 2023-04-24 RX ADMIN — IOHEXOL 80 ML: 350 INJECTION, SOLUTION INTRAVENOUS at 17:35

## 2023-04-24 RX ADMIN — SODIUM CHLORIDE 1000 ML: 9 INJECTION, SOLUTION INTRAVENOUS at 16:03

## 2023-04-24 RX ADMIN — PROTHROMBIN, COAGULATION FACTOR VII HUMAN, COAGULATION FACTOR IX HUMAN, COAGULATION FACTOR X HUMAN, PROTEIN C, PROTEIN S HUMAN, AND WATER 2000 UNITS: KIT at 17:34

## 2023-04-24 RX ADMIN — PANTOPRAZOLE SODIUM 40 MG: 40 INJECTION, POWDER, FOR SOLUTION INTRAVENOUS at 16:04

## 2023-04-24 ASSESSMENT — COGNITIVE AND FUNCTIONAL STATUS - GENERAL
SUGGESTED CMS G CODE MODIFIER DAILY ACTIVITY: CH
DAILY ACTIVITIY SCORE: 24
SUGGESTED CMS G CODE MODIFIER MOBILITY: CH
MOBILITY SCORE: 24

## 2023-04-24 ASSESSMENT — ENCOUNTER SYMPTOMS
CONSTITUTIONAL NEGATIVE: 1
RESPIRATORY NEGATIVE: 1
PALPITATIONS: 0
INSOMNIA: 0
NECK PAIN: 0
ABDOMINAL PAIN: 1
MYALGIAS: 0
HEARTBURN: 0
PSYCHIATRIC NEGATIVE: 1
LOSS OF CONSCIOUSNESS: 0
FOCAL WEAKNESS: 0
CHILLS: 0
EYE DISCHARGE: 0
POLYDIPSIA: 0
DIZZINESS: 0
FEVER: 0
WHEEZING: 0
HEADACHES: 0
NERVOUS/ANXIOUS: 0
NEUROLOGICAL NEGATIVE: 1
COUGH: 0
BRUISES/BLEEDS EASILY: 0
HEMOPTYSIS: 0
EYES NEGATIVE: 1
NAUSEA: 0
CARDIOVASCULAR NEGATIVE: 1
BLOOD IN STOOL: 1
ABDOMINAL PAIN: 0
EYE PAIN: 0
DIAPHORESIS: 0
MEMORY LOSS: 0
MUSCULOSKELETAL NEGATIVE: 1
DIARRHEA: 0
DOUBLE VISION: 0
SEIZURES: 0
CONSTIPATION: 0
VOMITING: 0

## 2023-04-24 ASSESSMENT — LIFESTYLE VARIABLES
TOTAL SCORE: 0
ALCOHOL_USE: YES
TOTAL SCORE: 0
EVER FELT BAD OR GUILTY ABOUT YOUR DRINKING: NO
HAVE PEOPLE ANNOYED YOU BY CRITICIZING YOUR DRINKING: NO
AVERAGE NUMBER OF DAYS PER WEEK YOU HAVE A DRINK CONTAINING ALCOHOL: 7
ON A TYPICAL DAY WHEN YOU DRINK ALCOHOL HOW MANY DRINKS DO YOU HAVE: 1
CONSUMPTION TOTAL: NEGATIVE
HOW MANY TIMES IN THE PAST YEAR HAVE YOU HAD 5 OR MORE DRINKS IN A DAY: 0
TOTAL SCORE: 0
EVER HAD A DRINK FIRST THING IN THE MORNING TO STEADY YOUR NERVES TO GET RID OF A HANGOVER: NO
HAVE YOU EVER FELT YOU SHOULD CUT DOWN ON YOUR DRINKING: NO

## 2023-04-24 ASSESSMENT — PATIENT HEALTH QUESTIONNAIRE - PHQ9
SUM OF ALL RESPONSES TO PHQ9 QUESTIONS 1 AND 2: 0
1. LITTLE INTEREST OR PLEASURE IN DOING THINGS: NOT AT ALL
2. FEELING DOWN, DEPRESSED, IRRITABLE, OR HOPELESS: NOT AT ALL

## 2023-04-24 ASSESSMENT — VISUAL ACUITY: OU: 1

## 2023-04-24 NOTE — ED PROVIDER NOTES
ED Provider Note    CHIEF COMPLAINT  Chief Complaint   Patient presents with    Rectal Bleeding     Since friday       EXTERNAL RECORDS REVIEWED  Outpatient Notes Office visit 11/8/22    HPI/ROS  LIMITATION TO HISTORY   Select: : None  OUTSIDE HISTORIAN(S):  Family Step sister    Abbi King is a 77 y.o. female who presents to the emergency department for evaluation of rectal bleeding.  The patient states that she started having dark blood per rectum 3 days ago.  She states that it is gotten more frequent and she states that she has had 5 episodes this morning.  She describes it as feeling the toilet bowl.  She states that she does feel short of breath and that her heart is pounding.  She denies any chest pain.  She admits to some abdominal cramping but denies any other pain.  She is currently on Xarelto for history of a stroke and heart attack.  She states that she has been taking this.  She states that she does have a history of hemorrhoids but this is much different than previous hemorrhoid bleeding.  She denies any fevers, runny nose, cough, congestion, or difficulty breathing.  She denies dysuria or hematuria.    PAST MEDICAL HISTORY   has a past medical history of Arrhythmia (01/01/2009), Brain aneurysm s/p coil (01/01/2010), CAD (coronary artery disease) (06/14/2012), Dyslipidemia (06/14/2012), Hemorrhagic disorder (HCC), History of CVA (cerebrovascular accident) (01/01/2010), Hypothyroid (06/14/2012), Indigestion, Right sided weakness (01/01/2010), and Stroke (HCC) (2010).    SURGICAL HISTORY   has a past surgical history that includes other; tonsillectomy; abdominal hysterectomy total; zzz ep/ablation; tendon lenghtening (Right, 8/3/2022); and tenotomy perc toe single tendon (Right, 9/26/2022).    FAMILY HISTORY  Family History   Problem Relation Age of Onset    Cancer Mother         lung    Heart Disease Mother     Heart Attack Mother     Thyroid Mother     Other Brother        SOCIAL HISTORY  Social  History     Tobacco Use    Smoking status: Former     Packs/day: 1.00     Years: 20.00     Pack years: 20.00     Types: Cigarettes     Quit date: 6/3/1974     Years since quittin.9    Smokeless tobacco: Never    Tobacco comments:     quit 20 years ago smoked for 20 years less then pack u day   Vaping Use    Vaping Use: Never used   Substance and Sexual Activity    Alcohol use: Yes     Comment: drink every other day    Drug use: No    Sexual activity: Yes     Partners: Male     Comment:  /can do all ADLs by herself       CURRENT MEDICATIONS  Home Medications       Reviewed by Kalpana Leal (Pharmacy Tech) on 23 at 1455  Med List Status: Complete     Medication Last Dose Status   acetaminophen (TYLENOL) 500 MG Tab > 4 days Active   Cholecalciferol (VITAMIN D3) 125 MCG (5000 UT) Cap 2023 Active   lisinopril (PRINIVIL) 5 MG TABS 2023 Active   omeprazole (PRILOSEC) 20 MG Tablet Delayed Response delayed-release tablet 2023 Active   rivaroxaban (XARELTO) 20 MG Tab tablet 2023 Active                    ALLERGIES  Allergies   Allergen Reactions    Epinephrine Palpitations    Darvocet [Propoxyphene N-Apap]      Makes crazy    Demerol      Makes crazy     PHYSICAL EXAM  VITAL SIGNS: /79   Pulse 89   Temp 36.7 °C (98 °F) (Temporal)   Resp 18   Wt 59.1 kg (130 lb 4.7 oz)   SpO2 98%   BMI 23.83 kg/m²   Constitutional: Alert and in no apparent distress.  HENT: Normocephalic atraumatic. Bilateral external ears normal. Nose normal. Mucous membranes are moist.  Eyes: Pupils are equal and reactive. Conjunctiva normal. Non-icteric sclera.   Neck: Normal range of motion without tenderness. Supple. No meningeal signs.  Cardiovascular: Tachycardic rate and regular rhythm. No murmurs, gallops or rubs.  Thorax & Lungs: No retractions, nasal flaring, or tachypnea. Breath sounds are clear to auscultation bilaterally. No wheezing, rhonchi or rales.  Abdomen: Soft, nontender and  nondistended. No hepatosplenomegaly.  Skin: Warm and dry. No rashes are noted.  Rectal: Giovanni - Venita, ED RN.  Multiple external hemorrhoids are noted.  There is gross blood.  No stool in the rectal vault.  Back: No bony tenderness, No CVA tenderness.   Extremities: 2+ peripheral pulses. Cap refill is less than 2 seconds. No edema, cyanosis, or clubbing.  Musculoskeletal: Good range of motion in all major joints. No tenderness to palpation or major deformities noted.   Neurologic: Alert and appropriate for age. The patient moves all 4 extremities without obvious deficits.    DIAGNOSTIC STUDIES / PROCEDURES  EKG  I have independently interpreted this EKG  Results for orders placed or performed during the hospital encounter of 23   EKG (NOW)   Result Value Ref Range    Report       [facility] Emergency Dept.    Test Date:  2023  Pt Name:    YASMEEN BASURTO                 Department: [facility]  MRN:        [id number]                      Room:       [place] [address]  Gender:     Female                       Technician: ROSALIA  :        1945                   Requested By:CHARISSE COTTO  Order #:    [id number]                    Reading MD: Charisse Cotto    Measurements  Intervals                                Axis  Rate:       84                           P:          79  HI:         156                          QRS:        54  QRSD:       77                           T:          54  QT:         355  QTc:        420    Interpretive Statements  Sinus rhythm  No ST elevation or depression noted. No arrhythmias noted.  Compared to ECG 2022 12:51:10  No significant changes  Electronically Signed On 2023 14:56:53 PDT by Charisse Cotto       LABS  Results for orders placed or performed during the hospital encounter of 23   CBC WITH DIFFERENTIAL   Result Value Ref Range    WBC 7.5 4.8 - 10.8 K/uL    RBC 3.65 (L) 4.20 - 5.40 M/uL    Hemoglobin 10.8 (L) 12.0 - 16.0 g/dL    Hematocrit 32.9 (L)  37.0 - 47.0 %    MCV 90.1 81.4 - 97.8 fL    MCH 29.6 27.0 - 33.0 pg    MCHC 32.8 (L) 33.6 - 35.0 g/dL    RDW 45.1 35.9 - 50.0 fL    Platelet Count 255 164 - 446 K/uL    MPV 9.2 9.0 - 12.9 fL    Neutrophils-Polys 65.20 44.00 - 72.00 %    Lymphocytes 26.00 22.00 - 41.00 %    Monocytes 7.40 0.00 - 13.40 %    Eosinophils 0.70 0.00 - 6.90 %    Basophils 0.40 0.00 - 1.80 %    Immature Granulocytes 0.30 0.00 - 0.90 %    Nucleated RBC 0.00 /100 WBC    Neutrophils (Absolute) 4.88 2.00 - 7.15 K/uL    Lymphs (Absolute) 1.94 1.00 - 4.80 K/uL    Monos (Absolute) 0.55 0.00 - 0.85 K/uL    Eos (Absolute) 0.05 0.00 - 0.51 K/uL    Baso (Absolute) 0.03 0.00 - 0.12 K/uL    Immature Granulocytes (abs) 0.02 0.00 - 0.11 K/uL    NRBC (Absolute) 0.00 K/uL   COMP METABOLIC PANEL   Result Value Ref Range    Sodium 141 135 - 145 mmol/L    Potassium 4.4 3.6 - 5.5 mmol/L    Chloride 105 96 - 112 mmol/L    Co2 24 20 - 33 mmol/L    Anion Gap 12.0 7.0 - 16.0    Glucose 121 (H) 65 - 99 mg/dL    Bun 20 8 - 22 mg/dL    Creatinine 0.72 0.50 - 1.40 mg/dL    Calcium 9.2 8.4 - 10.2 mg/dL    AST(SGOT) 12 12 - 45 U/L    ALT(SGPT) 9 2 - 50 U/L    Alkaline Phosphatase 74 30 - 99 U/L    Total Bilirubin 0.3 0.1 - 1.5 mg/dL    Albumin 4.2 3.2 - 4.9 g/dL    Total Protein 6.5 6.0 - 8.2 g/dL    Globulin 2.3 1.9 - 3.5 g/dL    A-G Ratio 1.8 g/dL   LIPASE   Result Value Ref Range    Lipase 30 7 - 58 U/L   TROPONIN   Result Value Ref Range    Troponin T <6 6 - 19 ng/L   proBrain Natriuretic Peptide, NT   Result Value Ref Range    NT-proBNP 66 0 - 125 pg/mL   LACTIC ACID   Result Value Ref Range    Lactic Acid 1.7 0.5 - 2.0 mmol/L   PROTHROMBIN TIME (INR)   Result Value Ref Range    PT 24.9 (H) 12.0 - 14.6 sec    INR 2.33 (H) 0.87 - 1.13   APTT   Result Value Ref Range    APTT 32.7 24.7 - 36.0 sec   COD (ADULT)   Result Value Ref Range    ABO Grouping Only A     Rh Grouping Only POS     Antibody Screen-Cod NEG    ABO Rh Confirm   Result Value Ref Range    ABO Rh Confirm  A POS    CORRECTED CALCIUM   Result Value Ref Range    Correct Calcium 9.0 8.5 - 10.5 mg/dL   ESTIMATED GFR   Result Value Ref Range    GFR (CKD-EPI) 86 >60 mL/min/1.73 m 2   EKG (NOW)   Result Value Ref Range    Report       [facility] Emergency Dept.    Test Date:  2023  Pt Name:    YASMEEN BASURTO                 Department[facility]  MRN:        [id number]                      [facility]  Gender:     Female                       Technician: ROSALIA  :        1945                   Requested By:CHARISSE COTTO  Order #:    [id number]                    Reading MD: Charisse Cotto    Measurements  Intervals                                Axis  Rate:       84                           P:          79  MI:         156                          QRS:        54  QRSD:       77                           T:          54  QT:         355  QTc:        420    Interpretive Statements  Sinus rhythm  No ST elevation or depression noted. No arrhythmias noted.  Compared to ECG 2022 12:51:10  No significant changes  Electronically Signed On 2023 14:56:53 PDT by Charisse Cotto       RADIOLOGY  I have independently interpreted the diagnostic imaging associated with this visit and am waiting the final reading from the radiologist.   My preliminary interpretation is as follows: No focal opacity in the lung fields.  Radiologist interpretation:   DX-CHEST-PORTABLE (1 VIEW)   Final Result      No radiographic evidence of acute cardiopulmonary process.      Radiopaque material over the right axilla is indeterminate and could even be extrinsic to the patient.      CT-ABDOMEN-PELVIS WITH   Final Result      1.  Diverticulosis without diverticulitis.      2.  Dense material within the sigmoid colon likely representing extravasated contrast/active bleeding, assuming patient has not ingested any radiopaque medication.      3.  Indeterminant 15 mm low-attenuation lesion in the posterior segment right hepatic lobe  potentially representing a hemangioma. Recommend follow-up imaging.      4.  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      5.  Aortic atherosclerosis without aneurysm.        COURSE & MEDICAL DECISION MAKING    ED Observation Status? No; Patient does not meet criteria for ED Observation.     INITIAL ASSESSMENT, COURSE AND PLAN  Care Narrative: This is a 77-year-old female presenting to the emergency department for evaluation of rectal bleeding.  On initial evaluation, the patient was noted to be tachycardic but her blood pressure was stable.  Her perfusion and mental status were also appropriate.  Her abdominal exam was benign but she did have gross blood per rectum.  An IV was established and work-up was initiated.  She was given a dose of IV Protonix.    Labs were notable for an H&H of 10.8 and 32.9, respectively.  This was significantly diminished from a normal H&H 9 months ago.  Her electrolytes were without significant derangement.  Her coags were elevated.  EKG was performed as she did have some shortness of breath.  No evidence of acute ischemia was noted.  Her troponin was normal.  Chest x-ray was overall unremarkable.    CT of the abdomen did reveal dense material within the sigmoid colon likely representing active bleeding.    3:50 PM - I discussed the case with Dr Wiggins, GI.  He recommended placing the patient on a clear liquid diet as well as bowel prep and he will plan to scope the patient tomorrow.    4:22 PM - I discussed the case with Dr Lee, hospitalist. He agreed with the plan and accepted the patient.     HYDRATION: Based on the patient's presentation of Tachycardia the patient was given IV fluids. IV Hydration was used because oral hydration was not adequate alone. Upon recheck following hydration, the patient was improved.    CRITICAL CARE NOTE:  Critical care time was provided for 35 minutes exclusive of separately billable procedures and treating other patients. This involved direct bedside  patient care, speaking with family members, review of past medical records, reviewing the results of the laboratory and diagnostic studies, consulting with other physicians, as well as evaluating the effectiveness of the therapy instituted as described.      ADDITIONAL PROBLEM LIST  GI bleeding, hepatic lesion, renal lesion, anemia  DISPOSITION AND DISCUSSIONS  I have discussed management of the patient with the following physicians and DANIEL's:  Dr Lee, hospitalist, Dr Wiggins, GI    Discussion of management with other [facility] [place] or appropriate source(s): None     Escalation of care considered, and ultimately not performed:IV fluids, blood analysis, and diagnostic imaging    Barriers to care at this time, including but not limited to:  None .     Decision tools and prescription drugs considered including, but not limited to:  Protonix .    FINAL IMPRESSION  1. Rectal bleeding    2. Liver mass    3. Renal mass      -ADMIT-    Electronically signed by: Charisse De La Fuente D.O., 4/24/2023 1:54 PM

## 2023-04-24 NOTE — ED NOTES
Med rec updated and complete, per pt   Allergies reviewed, per pt  Interviewed pt with sister at bedside with permission from pt.  Pt reports that she has not taken her GABAPENTIN 100MG in the last 30 days or longer.

## 2023-04-24 NOTE — ED NOTES
Pt to be admitted  room assignment received  is being evaluated by hospitalist and GI at BS for evaluation

## 2023-04-24 NOTE — CONSULTS
Gastroenterology Initial Consult Note               Author:  Robert Wiggins M.D. with JAMI Mckeon Date & Time Created: 4/24/2023 4:44 PM       Patient ID:  Name:             Abbi King  YOB: 1945  Age:                 77 y.o.  female  MRN:               [id number]      Referring Provider:  Kelley Lakhani MD      Presenting Chief Complaint:  Hematochezia      History of Present Illness:    This is a very pleasant 77 y.o. female with the past medical history that includes atrial fibrillation, aneurysm status post coiling, coronary artery disease, dyslipidemia, CVA, GERD who is seen in consultation for complaints hematochezia.  She had bright red blood passage per rectum that started about 2 days ago.  Over the last 24 hours the volume and frequency of the hematochezia has been quite a bit more.  She reports 6 or 7 bowel movements a day.  She denies any abdominal pain, nausea, vomiting.  She does feel somewhat weak and lightheaded but not dizzy.  She denies syncope.  This is never happened before.  She has had a colonoscopy but many years ago when she was in her 20s.  She takes Xarelto last dose was last night at 11 PM.    CBC today with hemoglobin 10.8, hematocrit 32.9, MCV 90.1.  CMP with glucose 121, otherwise normal.  CT abdomen pelvis with contrast demonstrates dense material within the sigmoid colon likely representing extravasated contrast/active bleeding.  She is also found to have indeterminate 15 mm low-attenuation lesion in the posterior right hepatic lobe, and incidental findings.      Review of Systems:  Review of Systems   Constitutional:  Positive for malaise/fatigue. Negative for chills and fever.   HENT:  Negative for ear discharge and hearing loss.    Eyes:  Negative for pain and discharge.   Respiratory:  Negative for cough and hemoptysis.    Cardiovascular:  Negative for chest pain and palpitations.   Gastrointestinal:  Positive for blood in stool. Negative for  abdominal pain.   Genitourinary:  Negative for dysuria and urgency.   Musculoskeletal:  Negative for myalgias and neck pain.   Skin:  Negative for itching and rash.   Neurological:  Negative for dizziness and headaches.   Endo/Heme/Allergies:  Negative for environmental allergies and polydipsia.   Psychiatric/Behavioral:  Negative for memory loss. The patient does not have insomnia.            Past Medical History:  Past Medical History:   Diagnosis Date    Arrhythmia 01/01/2009    hx of AFIB    Brain aneurysm s/p coil 01/01/2010    CAD (coronary artery disease) 06/14/2012    Dyslipidemia 06/14/2012    Hemorrhagic disorder (HCC)     History of CVA (cerebrovascular accident) 01/01/2010    pt has right side weakness/upper ext. flaccid    Hypothyroid 06/14/2012    Indigestion     GERD    Right sided weakness 01/01/2010    Stroke (HCC) 2010     Active Hospital Problems    Diagnosis     GIB (gastrointestinal bleeding) [K92.2]     External hemorrhoid [K64.4]     Gastroesophageal reflux disease [K21.9]     Hypothyroid [E03.9]     History of CVA (cerebrovascular accident) [Z86.73]     Dyslipidemia [E78.5]     CAD (coronary artery disease) [I25.10]          Past Surgical History:  Past Surgical History:   Procedure Laterality Date    UT TENOTOMY PERC TOE SINGLE TENDON Right 9/26/2022    Procedure: RIGHT SECOND, THIRD, FOURTH GREAT TOE FLEXOR TENOTOMY;  Surgeon: Jaden Dean M.D.;  Location: [facility];  Service: Orthopedics    TENDON LENGHTENING Right 8/3/2022    Procedure: RIGHT ACHILLES TENDON LENGTHENING;  Surgeon: Jaden Dean M.D.;  Location: SUR[facility];  Service: Orthopedics    ABDOMINAL HYSTERECTOMY TOTAL      OTHER      coil aneurysm    TONSILLECTOMY      ZZZ EP/ABLATION      cardiac ablation            Hospital Medications:  Current Facility-Administered Medications   Medication Dose Frequency Provider Last Rate Last Admin    NS infusion   Continuous Zeny Lakhani  M.D.        Pharmacy Consult Request ...Pain Management Review 1 Each  1 Each PHARMACY TO DOSE Zeny Lakhani M.D.        oxyCODONE immediate-release (ROXICODONE) tablet 5 mg  5 mg Q3HRS PRN Zeny Lakhani M.D.        Or    oxyCODONE immediate release (ROXICODONE) tablet 10 mg  10 mg Q3HRS PRN Zeny Lakhani M.D.        Or    morphine 4 MG/ML injection 4 mg  4 mg Q3HRS PRN Zeny Lakhani M.D.        labetalol (NORMODYNE/TRANDATE) injection 10 mg  10 mg Q4HRS PRN Zeny Lakhani M.D.        ondansetron (ZOFRAN) syringe/vial injection 4 mg  4 mg Q4HRS PRN Zeny Lakhani M.D.        ondansetron (ZOFRAN ODT) dispertab 4 mg  4 mg Q4HRS PRN Zeny Lakhani M.D.        pantoprazole (Protonix) injection 40 mg  40 mg BID Zeny Lakhani M.D.       Last reviewed on 2023  4:25 PM by Zeny Lakhani M.D.       Current Outpatient Medications:  (Not in a hospital admission)        Medication Allergies:  Allergies   Allergen Reactions    Epinephrine Palpitations    Darvocet [Propoxyphene N-Apap]      Makes crazy    Demerol      Makes crazy         Family Medical History:  Family History   Problem Relation Age of Onset    Cancer Mother         lung    Heart Disease Mother     Heart Attack Mother     Thyroid Mother     Other Brother          Social History:  Social History     Socioeconomic History    Marital status:      Spouse name: Not on file    Number of children: Not on file    Years of education: Not on file    Highest education level: Not on file   Occupational History    Not on file   Tobacco Use    Smoking status: Former     Packs/day: 1.00     Years: 20.00     Pack years: 20.00     Types: Cigarettes     Quit date: 6/3/1974     Years since quittin.9    Smokeless tobacco: Never    Tobacco comments:     quit 20 years ago smoked for 20 years less then pack u day   Vaping Use    Vaping Use: Never used   Substance and Sexual Activity    Alcohol use: Yes     Comment: drink every other day    Drug use: No    Sexual  activity: Yes     Partners: Male     Comment:  /can do all ADLs by herself   Other Topics Concern    Not on file   Social History Narrative    Not on file     Social Determinants of Health     Financial Resource Strain: Not on file   Food Insecurity: Not on file   Transportation Needs: Not on file   Physical Activity: Not on file   Stress: Not on file   Social Connections: Not on file   Intimate Partner Violence: Not on file   Housing Stability: Not on file         Vital signs:  Weight/BMI: Body mass index is 23.83 kg/m².  /79   Pulse 89   Temp 36.7 °C (98 °F) (Temporal)   Resp 18   Wt 59.1 kg (130 lb 4.7 oz)   SpO2 98%   Vitals:    04/24/23 1158 04/24/23 1426 04/24/23 1429 04/24/23 1436   BP: (!) 143/68 139/77 117/79    Pulse: (!) 103 89 91 89   Resp: 20 18 20 18   Temp: 36.7 °C (98 °F)      TempSrc: Temporal      SpO2: 98% 99% 99% 98%   Weight:         Oxygen Therapy:  Pulse Oximetry: 98 %  No intake or output data in the 24 hours ending 04/24/23 1644      Physical Exam:  Physical Exam  Vitals and nursing note reviewed.   Constitutional:       Appearance: Normal appearance.   HENT:      Head: Normocephalic and atraumatic.      Right Ear: External ear normal.      Left Ear: External ear normal.      Nose: Nose normal. No congestion.      Mouth/Throat:      Mouth: Mucous membranes are moist.      Pharynx: Oropharynx is clear.   Eyes:      General: No scleral icterus.     Extraocular Movements: Extraocular movements intact.      Pupils: Pupils are equal, round, and reactive to light.   Cardiovascular:      Rate and Rhythm: Normal rate and regular rhythm.      Pulses: Normal pulses.      Heart sounds: Normal heart sounds. No murmur heard.  Pulmonary:      Effort: Pulmonary effort is normal. No respiratory distress.      Breath sounds: Normal breath sounds.   Abdominal:      General: Abdomen is flat. Bowel sounds are normal. There is no distension.      Palpations: Abdomen is soft.      Tenderness:  There is no abdominal tenderness.   Musculoskeletal:      Cervical back: Neck supple.      Right lower leg: No edema.      Left lower leg: No edema.   Lymphadenopathy:      Cervical: No cervical adenopathy.   Skin:     General: Skin is warm and dry.      Capillary Refill: Capillary refill takes 2 to 3 seconds.      Coloration: Skin is pale.   Neurological:      General: No focal deficit present.      Mental Status: She is alert and oriented to person, place, and time.   Psychiatric:         Thought Content: Thought content normal.         Judgment: Judgment normal.         Labs:  Recent Labs     04/24/23  1420   SODIUM 141   POTASSIUM 4.4   CHLORIDE 105   CO2 24   BUN 20   CREATININE 0.72   CALCIUM 9.2     Recent Labs     04/24/23  1420   ALTSGPT 9   ASTSGOT 12   ALKPHOSPHAT 74   TBILIRUBIN 0.3   LIPASE 30   GLUCOSE 121*     Recent Labs     04/24/23  1420   WBC 7.5   NEUTSPOLYS 65.20   LYMPHOCYTES 26.00   MONOCYTES 7.40   EOSINOPHILS 0.70   BASOPHILS 0.40   ASTSGOT 12   ALTSGPT 9   ALKPHOSPHAT 74   TBILIRUBIN 0.3     Recent Labs     04/24/23  1420   RBC 3.65*   HEMOGLOBIN 10.8*   HEMATOCRIT 32.9*   PLATELETCT 255   PROTHROMBTM 24.9*   APTT 32.7   INR 2.33*     Recent Results (from the past 24 hour(s))   CBC WITH DIFFERENTIAL    Collection Time: 04/24/23  2:20 PM   Result Value Ref Range    WBC 7.5 4.8 - 10.8 K/uL    RBC 3.65 (L) 4.20 - 5.40 M/uL    Hemoglobin 10.8 (L) 12.0 - 16.0 g/dL    Hematocrit 32.9 (L) 37.0 - 47.0 %    MCV 90.1 81.4 - 97.8 fL    MCH 29.6 27.0 - 33.0 pg    MCHC 32.8 (L) 33.6 - 35.0 g/dL    RDW 45.1 35.9 - 50.0 fL    Platelet Count 255 164 - 446 K/uL    MPV 9.2 9.0 - 12.9 fL    Neutrophils-Polys 65.20 44.00 - 72.00 %    Lymphocytes 26.00 22.00 - 41.00 %    Monocytes 7.40 0.00 - 13.40 %    Eosinophils 0.70 0.00 - 6.90 %    Basophils 0.40 0.00 - 1.80 %    Immature Granulocytes 0.30 0.00 - 0.90 %    Nucleated RBC 0.00 /100 WBC    Neutrophils (Absolute) 4.88 2.00 - 7.15 K/uL    Lymphs (Absolute) 1.94  1.00 - 4.80 K/uL    Monos (Absolute) 0.55 0.00 - 0.85 K/uL    Eos (Absolute) 0.05 0.00 - 0.51 K/uL    Baso (Absolute) 0.03 0.00 - 0.12 K/uL    Immature Granulocytes (abs) 0.02 0.00 - 0.11 K/uL    NRBC (Absolute) 0.00 K/uL   COMP METABOLIC PANEL    Collection Time: 04/24/23  2:20 PM   Result Value Ref Range    Sodium 141 135 - 145 mmol/L    Potassium 4.4 3.6 - 5.5 mmol/L    Chloride 105 96 - 112 mmol/L    Co2 24 20 - 33 mmol/L    Anion Gap 12.0 7.0 - 16.0    Glucose 121 (H) 65 - 99 mg/dL    Bun 20 8 - 22 mg/dL    Creatinine 0.72 0.50 - 1.40 mg/dL    Calcium 9.2 8.4 - 10.2 mg/dL    AST(SGOT) 12 12 - 45 U/L    ALT(SGPT) 9 2 - 50 U/L    Alkaline Phosphatase 74 30 - 99 U/L    Total Bilirubin 0.3 0.1 - 1.5 mg/dL    Albumin 4.2 3.2 - 4.9 g/dL    Total Protein 6.5 6.0 - 8.2 g/dL    Globulin 2.3 1.9 - 3.5 g/dL    A-G Ratio 1.8 g/dL   LIPASE    Collection Time: 04/24/23  2:20 PM   Result Value Ref Range    Lipase 30 7 - 58 U/L   TROPONIN    Collection Time: 04/24/23  2:20 PM   Result Value Ref Range    Troponin T <6 6 - 19 ng/L   proBrain Natriuretic Peptide, NT    Collection Time: 04/24/23  2:20 PM   Result Value Ref Range    NT-proBNP 66 0 - 125 pg/mL   PROTHROMBIN TIME (INR)    Collection Time: 04/24/23  2:20 PM   Result Value Ref Range    PT 24.9 (H) 12.0 - 14.6 sec    INR 2.33 (H) 0.87 - 1.13   APTT    Collection Time: 04/24/23  2:20 PM   Result Value Ref Range    APTT 32.7 24.7 - 36.0 sec   ABO Rh Confirm    Collection Time: 04/24/23  2:20 PM   Result Value Ref Range    ABO Rh Confirm A POS    CORRECTED CALCIUM    Collection Time: 04/24/23  2:20 PM   Result Value Ref Range    Correct Calcium 9.0 8.5 - 10.5 mg/dL   ESTIMATED GFR    Collection Time: 04/24/23  2:20 PM   Result Value Ref Range    GFR (CKD-EPI) 86 >60 mL/min/1.73 m 2   COD (ADULT)    Collection Time: 04/24/23  2:25 PM   Result Value Ref Range    ABO Grouping Only A     Rh Grouping Only POS     Antibody Screen-Cod NEG    EKG (NOW)    Collection Time: 04/24/23   2:38 PM   Result Value Ref Range    Report       [facility] Emergency Dept.    Test Date:  2023  Pt Name:    YASMEEN BASURTO                 Department: [facility]  MRN:        [id number]                      Room:       -ROOM 6  Gender:     Female                       Technician: ROSALIA  :        1945                   Requested By:CHARISSE COTTO  Order #:    [id number]                    Reading MD: Charisse Cotto    Measurements  Intervals                                Axis  Rate:       84                           P:          79  AL:         156                          QRS:        54  QRSD:       77                           T:          54  QT:         355  QTc:        420    Interpretive Statements  Sinus rhythm  No ST elevation or depression noted. No arrhythmias noted.  Compared to ECG 2022 12:51:10  No significant changes  Electronically Signed On 2023 14:56:53 PDT by Charisse Cotto     LACTIC ACID    Collection Time: 23  2:47 PM   Result Value Ref Range    Lactic Acid 1.7 0.5 - 2.0 mmol/L         Radiology Review:  DX-CHEST-PORTABLE (1 VIEW)   Final Result      No radiographic evidence of acute cardiopulmonary process.      Radiopaque material over the right axilla is indeterminate and could even be extrinsic to the patient.      CT-ABDOMEN-PELVIS WITH   Final Result      1.  Diverticulosis without diverticulitis.      2.  Dense material within the sigmoid colon likely representing extravasated contrast/active bleeding, assuming patient has not ingested any radiopaque medication.      3.  Indeterminant 15 mm low-attenuation lesion in the posterior segment right hepatic lobe potentially representing a hemangioma. Recommend follow-up imaging.      4.  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      5.  Aortic atherosclerosis without aneurysm.            MDM (Data Review):   -Records reviewed and summarized in current documentation  -I personally reviewed and interpreted the laboratory  results  -I personally reviewed the radiology images        Medical Decision Making, by Problem:  Active Hospital Problems    Diagnosis     GIB (gastrointestinal bleeding) [K92.2]     External hemorrhoid [K64.4]     Gastroesophageal reflux disease [K21.9]     Hypothyroid [E03.9]     History of CVA (cerebrovascular accident) [Z86.73]     Dyslipidemia [E78.5]     CAD (coronary artery disease) [I25.10]            Assessment/Recommendations:  77-year-old female patient with hematochezia, acute onset without pain.  CT scan with sigmoid dense material likely contrast or blood.  Suspect diverticular bleed.  She is also anemic.    Assessment:  -Hematochezia-likely secondary to diverticular bleed  -Acute posthemorrhagic anemia  -Chronic anticoagulation with Xarelto last dose evening 4/23/2023  -Coronary artery disease  -History of CVA    Plan:  -Recommend Kcentra reversal of her anticoagulation  -Clear liquid diet, n.p.o. after midnight  -Recommend CT angiography of the abdomen and pelvis.  If this is positive for GI bleed and area of extravasation, recommend transfer to [facility] for interventional radiology evaluation and embolization.  -If CTA is negative, we will need to proceed forward with colonoscopy which is tentatively scheduled for 3:30 PM tomorrow with Dr. Robert Wiggins.    Risks, benefits, and alternatives of aforementioned procedures were discussed with patient..  Consenting person(s) were given opportunities to ask questions and discuss other options.  Risks including but not limited to perforation, infection, bleeding, missed lesion(s), possible need for surgery(ies) and/or interventional radiology, possible need for repeat procedure(s) and/or additional testing, hospitalization possibly prolonged, cardiac and/or pulmonary event, aspiration, hypoxia, stroke, medication and/or anesthesia reaction, indefinite diagnosis, discomfort, unsuccessful and/or incomplete procedure, ineffective  therapy and/or persistent symptoms, damage to adjacent organs and/or vascular structures, and other adverse events possibly life-threatening.  Interactive discussion was undertaken with Layman's terms. I answered questions in full and to satisfaction.  Consenting person(s) stated understanding and acceptance of these risks, and wished to proceed.  Informed consent was given in clear state of mind.     -MoviPrep this evening if CT is negative  -Serial hemoglobin and hematocrit, transfuse for hemoglobin less than 7      GI Attending -    Patient seen, examined, chart reviewed and case discussed and plan arrived at with A.P.R.N.  Agree with this note.    Agree with CTA and if positive transfer to [facility] for possible IR.    Robert Wiggins M.D.    Donny Franco, A.P.R.N.      Thank you for inviting me to participate in the care of this patient. Please do not hesitate to call GI consultants with additional questions/concerns or changes in the patient's clinical status at [phone].      Core Quality Measures   Reviewed items:  Labs, Medications and Radiology reports reviewed

## 2023-04-25 VITALS
BODY MASS INDEX: 24.23 KG/M2 | OXYGEN SATURATION: 100 % | DIASTOLIC BLOOD PRESSURE: 58 MMHG | RESPIRATION RATE: 18 BRPM | SYSTOLIC BLOOD PRESSURE: 122 MMHG | HEART RATE: 83 BPM | WEIGHT: 132.5 LBS | TEMPERATURE: 98.3 F

## 2023-04-25 PROBLEM — K92.2 GIB (GASTROINTESTINAL BLEEDING): Status: RESOLVED | Noted: 2023-04-24 | Resolved: 2023-04-25

## 2023-04-25 LAB
ALBUMIN SERPL BCP-MCNC: 3.5 G/DL (ref 3.2–4.9)
BUN SERPL-MCNC: 14 MG/DL (ref 8–22)
CALCIUM ALBUM COR SERPL-MCNC: 8.9 MG/DL (ref 8.5–10.5)
CALCIUM SERPL-MCNC: 8.5 MG/DL (ref 8.4–10.2)
CFT BLD TEG: 4.7 MIN (ref 4.6–9.1)
CFT P HPASE BLD TEG: 5.2 MIN (ref 4.3–8.3)
CHLORIDE SERPL-SCNC: 111 MMOL/L (ref 96–112)
CLOT ANGLE BLD TEG: 75.5 DEGREES (ref 63–78)
CLOT LYSIS 30M P MA LENFR BLD TEG: 0 % (ref 0–2.6)
CO2 SERPL-SCNC: 20 MMOL/L (ref 20–33)
CREAT SERPL-MCNC: 0.62 MG/DL (ref 0.5–1.4)
CT.EXTRINSIC BLD ROTEM: 1 MIN (ref 0.8–2.1)
ERYTHROCYTE [DISTWIDTH] IN BLOOD BY AUTOMATED COUNT: 47.4 FL (ref 35.9–50)
GFR SERPLBLD CREATININE-BSD FMLA CKD-EPI: 92 ML/MIN/1.73 M 2
GLUCOSE SERPL-MCNC: 94 MG/DL (ref 65–99)
HCT VFR BLD AUTO: 24.3 % (ref 37–47)
HGB BLD-MCNC: 7.8 G/DL (ref 12–16)
HGB BLD-MCNC: 7.8 G/DL (ref 12–16)
HGB BLD-MCNC: 8 G/DL (ref 12–16)
MAGNESIUM SERPL-MCNC: 1.8 MG/DL (ref 1.5–2.5)
MCF BLD TEG: 62.7 MM (ref 52–69)
MCF.PLATELET INHIB BLD ROTEM: 19.4 MM (ref 15–32)
MCH RBC QN AUTO: 29.9 PG (ref 27–33)
MCHC RBC AUTO-ENTMCNC: 32.1 G/DL (ref 33.6–35)
MCV RBC AUTO: 93.1 FL (ref 81.4–97.8)
PHOSPHATE SERPL-MCNC: 3.5 MG/DL (ref 2.5–4.5)
PLATELET # BLD AUTO: 201 K/UL (ref 164–446)
PMV BLD AUTO: 9.6 FL (ref 9–12.9)
POTASSIUM SERPL-SCNC: 4 MMOL/L (ref 3.6–5.5)
RBC # BLD AUTO: 2.61 M/UL (ref 4.2–5.4)
SODIUM SERPL-SCNC: 139 MMOL/L (ref 135–145)
TEG ALGORITHM TGALG: NORMAL
WBC # BLD AUTO: 5 K/UL (ref 4.8–10.8)

## 2023-04-25 PROCEDURE — 85018 HEMOGLOBIN: CPT

## 2023-04-25 PROCEDURE — 99239 HOSP IP/OBS DSCHRG MGMT >30: CPT | Performed by: INTERNAL MEDICINE

## 2023-04-25 PROCEDURE — 85027 COMPLETE CBC AUTOMATED: CPT

## 2023-04-25 PROCEDURE — 80069 RENAL FUNCTION PANEL: CPT

## 2023-04-25 PROCEDURE — C9113 INJ PANTOPRAZOLE SODIUM, VIA: HCPCS | Performed by: HOSPITALIST

## 2023-04-25 PROCEDURE — G0378 HOSPITAL OBSERVATION PER HR: HCPCS

## 2023-04-25 PROCEDURE — 36415 COLL VENOUS BLD VENIPUNCTURE: CPT

## 2023-04-25 PROCEDURE — 83735 ASSAY OF MAGNESIUM: CPT

## 2023-04-25 PROCEDURE — 700105 HCHG RX REV CODE 258: Performed by: HOSPITALIST

## 2023-04-25 PROCEDURE — 94760 N-INVAS EAR/PLS OXIMETRY 1: CPT

## 2023-04-25 PROCEDURE — 700111 HCHG RX REV CODE 636 W/ 250 OVERRIDE (IP): Performed by: HOSPITALIST

## 2023-04-25 RX ORDER — ACETAMINOPHEN 325 MG/1
325 TABLET ORAL EVERY 6 HOURS PRN
Status: DISCONTINUED | OUTPATIENT
Start: 2023-04-25 | End: 2023-04-25 | Stop reason: HOSPADM

## 2023-04-25 RX ORDER — BENZOCAINE/MENTHOL 6 MG-10 MG
LOZENGE MUCOUS MEMBRANE 2 TIMES DAILY
Status: DISCONTINUED | OUTPATIENT
Start: 2023-04-25 | End: 2023-04-25

## 2023-04-25 RX ADMIN — SODIUM CHLORIDE: 9 INJECTION, SOLUTION INTRAVENOUS at 09:32

## 2023-04-25 RX ADMIN — PANTOPRAZOLE SODIUM 40 MG: 40 INJECTION, POWDER, LYOPHILIZED, FOR SOLUTION INTRAVENOUS at 05:11

## 2023-04-25 ASSESSMENT — PATIENT HEALTH QUESTIONNAIRE - PHQ9
SUM OF ALL RESPONSES TO PHQ9 QUESTIONS 1 AND 2: 0
2. FEELING DOWN, DEPRESSED, IRRITABLE, OR HOPELESS: NOT AT ALL
1. LITTLE INTEREST OR PLEASURE IN DOING THINGS: NOT AT ALL

## 2023-04-25 ASSESSMENT — ENCOUNTER SYMPTOMS
HEADACHES: 0
INSOMNIA: 0
CHILLS: 0
BLOOD IN STOOL: 0
PALPITATIONS: 0
HEMOPTYSIS: 0
DIZZINESS: 0
FEVER: 0
POLYDIPSIA: 0
ABDOMINAL PAIN: 0
COUGH: 0
MEMORY LOSS: 0

## 2023-04-25 ASSESSMENT — FIBROSIS 4 INDEX
FIB4 SCORE: 1.19
FIB4 SCORE: 1.19

## 2023-04-25 NOTE — ASSESSMENT & PLAN NOTE
History of CVA with right-sided hemiplegia  Patient wears a brace on her right lower extremity  Patient uses Xarelto to prevent further strokes  Xarelto will need to be reversed with Kcentra right now given the fact the patient is ongoing with bleeding

## 2023-04-25 NOTE — DISCHARGE PLANNING
note:  Providing coverage after hrs.    Code 44 letter given to pt. She signed letter.   Questions answered.

## 2023-04-25 NOTE — PROGRESS NOTES
Received bedside report from RN Navarro, pt care assumed. Pt assessment complete. Pt A&Ox4, pt c/o 0/10 pain at this time. POC discussed with pt and verbalizes no questions. Pt denies any additional needs at this time. Bed locked and in lowest position, bed alarm on. Pt educated on fall risk and verbalized understanding, call light within reach, hourly rounding initiated.

## 2023-04-25 NOTE — DISCHARGE SUMMARY
"Discharge Summary    CHIEF COMPLAINT ON ADMISSION  Chief Complaint   Patient presents with    Rectal Bleeding     Since friday       Reason for Admission  Rectal Bleeding     Admission Date  4/24/2023    CODE STATUS  DNAR/DNI    HPI & HOSPITAL COURSE  This is \"Abbi King is a 77 y.o. female who presented 4/24/2023 with recurrent episodes of gastrointestinal bleeding.  Each episode she says covers the toilet and blood.  The patient has had 6 episodes so far and she is about to have her 7th one.  The patient reports mild abdominal discomfort in the left lower quadrant with the bleeding.  Patient otherwise with does not report any nausea or vomiting.  No headache, no nausea vomiting, no fevers or chills, no dizziness, no syncopal episode, no chest pain no shortness of breath.  Initial CT of the abdomen shows possible diverticular bleeding near the sigmoid colon.  Patient has a history of hemorrhoids however these are currently not bleeding.  Patient will need a stat CT of the abdomen if that is positive patient will need to go to [facility] for embolization through IR.  If that is negative patient will need colonoscopy through gastroenterology.  Monitor H&H and transfuse if hemoglobin drops below 7 or hematocrit drops below 21.  Reverse Xarelto with Kcentra.  Monitor cardiac status, monitor renal functions, monitor respiratory status, monitor neurological status.  Patient has hemiplegia from previous stroke on the right side.  Patient has established coronary artery disease.     I discussed the plan of care with patient, bedside RN, and emergency room physician and gastroenterology, I have spoken with SALMA Mckeon as well as Dr. Stoddard of GI .\"  (As per admitting physician Dr. Lakhani).    She did present with acute blood loss anemia due to acute GI bleed, suspecting diverticular bleed due to use of chronic Xarelto.  She did present with a hemoglobin of 10.8, reduced to 7.8 and stabilized.  She had no " further GI bleeds at this time.  CTA abdomen pelvis with contrast did not show any diffuse intestinal bleed.  Patient was not transfused on this hospitalization.  I had spoken to patient about her need for endoscopy/colonoscopy, she stated that she does take care of her disabled  at home she was requesting to be able to discharge today if there was no further procedure to be performed.  I have spoken with GI consultants who recommended as patient no longer has any hematochezia or melena  to follow-up as outpatient, no further recommendations for inpatient colonoscopy.    For patient's anticoagulation, I did discuss with her about switching Xarelto to Eliquis, as Eliquis was studied against warfarin and showed potentially lower risk for GI bleed.  It is unclear if she will have a lower risk given her diverticular bleed but she did agree to take the risk as she requires ongoing anticoagulation.  She has a history of CVA, brain aneurysm and is at high risk if she stops anticoagulation.    Patient did present with lower GI bleed, complained of maroon-colored stools.    Therefore, she is discharged in fair and stable condition to home with close outpatient follow-up.    The patient recovered much more quickly than anticipated on admission.    Discharge Date  04/25/2023    FOLLOW UP ITEMS POST DISCHARGE  With PCP and GI Consultants    DISCHARGE DIAGNOSES  Principal Problem (Resolved):    GIB (gastrointestinal bleeding) POA: Yes  Active Problems:    History of CVA (cerebrovascular accident) POA: Yes      Overview: Right sided wekness    Hypothyroid POA: Yes    Dyslipidemia POA: Yes      Overview: Cannot tolerate the statins    CAD (coronary artery disease) POA: Yes      Overview: In 2005    Gastroesophageal reflux disease POA: Yes    External hemorrhoid POA: Yes    DNR (do not resuscitate) POA: Unknown      FOLLOW UP  No future appointments.  [facility]  [address] [facility]  [id number]  [phone]  Call  Please call to follow-up with gastroenterology consultants at one of their offices.    [facility] - [facility]  [id number]  [place] [id number] [phone]          MEDICATIONS ON DISCHARGE     Medication List        START taking these medications        Instructions   apixaban 5mg Tabs  Start taking on: April 26, 2023  Commonly known as: ELIQUIS  Next Dose Due: Resume tomorrow morning 04/26   Take 1 Tablet by mouth 2 times a day for 30 days.  Dose: 5 mg            CHANGE how you take these medications        Instructions   lisinopril 5 MG Tabs  What changed: when to take this  Commonly known as: PRINIVIL  Next Dose Due: Resume normal home routine tomorrow 04/26   Take 1 Tab by mouth every day.  Dose: 5 mg            CONTINUE taking these medications        Instructions   acetaminophen 500 MG Tabs  Commonly known as: TYLENOL   Take 1,000 mg by mouth every 6 hours as needed. Indications: Pain  Dose: 1,000 mg     omeprazole 20 MG Tbec delayed-release tablet  Commonly known as: PRILOSEC  Next Dose Due: Next dose tomorrow 04/26   Take 20 mg by mouth every evening.  Dose: 20 mg     vitamin D3 125 MCG (5000 UT) Caps  Next Dose Due: Resume normal home routine tonight 04/25   Take 5,000 Units by mouth every evening.  Dose: 5,000 Units            STOP taking these medications      rivaroxaban 20 MG Tabs tablet  Commonly known as: XARELTO              Allergies  Allergies   Allergen Reactions    Epinephrine Palpitations    Darvocet [Propoxyphene N-Apap]      Makes crazy    Demerol      Makes crazy       DIET  Orders Placed This Encounter   Procedures    Diet Order Diet: Clear Liquid; Miscellaneous modifications: (optional): No Red     Standing Status:   Standing     Number of Occurrences:   1     Order Specific Question:   Diet:     Answer:   Clear Liquid [10]     Order Specific Question:   Miscellaneous modifications: (optional)     Answer:   No Red [61]        ACTIVITY  As tolerated.  Weight bearing as tolerated    CONSULTATIONS  [facility]    PROCEDURES  none    LABORATORY  Lab Results   Component Value Date    SODIUM 139 04/25/2023    POTASSIUM 4.0 04/25/2023    CHLORIDE 111 04/25/2023    CO2 20 04/25/2023    GLUCOSE 94 04/25/2023    BUN 14 04/25/2023    CREATININE 0.62 04/25/2023        Lab Results   Component Value Date    WBC 5.0 04/25/2023    HEMOGLOBIN 8.0 (L) 04/25/2023    HEMATOCRIT 24.3 (L) 04/25/2023    PLATELETCT 201 04/25/2023        Total time of the discharge process exceeds 32 minutes.  More than 50% of time was spent face to face with patient.  This included but not limited to review of hospital course with patient, treatment goals upon discharge, recommendations to PCP, continued and new medications and their adverse reactions, and nursing instructions for patient.            CTA ABDOMEN PELVIS W & W/O POST PROCESS   Final Result      1.  No areas of active arterial extravasation. No hyperdense material in the sigmoid colon on today's study.   2.  Extensive colonic diverticulosis.   3.  No acute inflammatory process in the abdomen or pelvis.   4.  Right hepatic lesion described on the recent CT is not well seen on this arterial phase study. Consider evaluation with contrast-enhanced renal protocol MRI.      DX-CHEST-PORTABLE (1 VIEW)   Final Result      No radiographic evidence of acute cardiopulmonary process.      Radiopaque material over the right axilla is indeterminate and could even be extrinsic to the patient.      CT-ABDOMEN-PELVIS WITH   Final Result      1.  Diverticulosis without diverticulitis.      2.  Dense material within the sigmoid colon likely representing extravasated contrast/active bleeding, assuming patient has not ingested any radiopaque medication.      3.  Indeterminant 15 mm low-attenuation lesion in the posterior segment right hepatic lobe potentially representing a hemangioma. Recommend follow-up imaging.      4.  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      5.  Aortic atherosclerosis without aneurysm.

## 2023-04-25 NOTE — PROGRESS NOTES
4 Eyes Skin Assessment Completed by LILY Lyons and LILY Hyde.    Head WDL  Ears WDL  Nose WDL  Mouth WDL  Neck WDL  Breast/Chest WDL  Shoulder Blades WDL  Spine WDL  (R) Arm/Elbow/Hand WDL  (L) Arm/Elbow/Hand WDL  Abdomen WDL  Groin WDL  Scrotum/Coccyx/Buttocks WDL  (R) Leg WDL  (L) Leg WDL  (R) Heel/Foot/Toe WDL  (L) Heel/Foot/Toe WDL          Devices In Places Tele Box      Interventions In Place N/A    Possible Skin Injury No    Pictures Uploaded Into Epic N/A  Wound Consult Placed N/A  RN Wound Prevention Protocol Ordered No

## 2023-04-25 NOTE — ASSESSMENT & PLAN NOTE
After discussing with the patient patient says she does not want to be resuscitated in the case she becomes terminally ill.  CODE STATUS will be updated.

## 2023-04-25 NOTE — CARE PLAN
The patient is Stable - Low risk of patient condition declining or worsening    Shift Goals  Clinical Goals: NPO midnight, monitor blood output/H&H  Patient Goals: Rest  Family Goals: NATASHA    Progress made toward(s) clinical / shift goals:      Pt has passed blood in stool once this shift. Monitor H&H/transfuse per orders.     Problem: Knowledge Deficit - Standard  Goal: Patient and family/care givers will demonstrate understanding of plan of care, disease process/condition, diagnostic tests and medications  Outcome: Progressing     Problem: Skin Integrity  Goal: Skin integrity is maintained or improved  Outcome: Progressing     Problem: Fall Risk  Goal: Patient will remain free from falls

## 2023-04-25 NOTE — PROGRESS NOTES
Patient education and discharge instructions provided. All questions answered, no concerns. Patient knows she needs a repeat CBC by April 28th. She has an appointment scheduled with PCP on Tuesday and will schedule an appointment asap with GI. Correct pharmacy was confirmed, [facility]. Patient will be escorted out via wheelchair by CNA

## 2023-04-25 NOTE — H&P
Hospital Medicine History & Physical Note    Date of Service  4/24/2023    Primary Care Physician  Pcp Pt States None    Consultants  GI    Specialist Names: Dr. Stoddard    Code Status  DNAR/DNI    Chief Complaint  Chief Complaint   Patient presents with    Rectal Bleeding     Since friday       History of Presenting Illness  Abbi King is a 77 y.o. female who presented 4/24/2023 with recurrent episodes of gastrointestinal bleeding.  Each episode she says covers the toilet and blood.  The patient has had 6 episodes so far and she is about to have her 7th one.  The patient reports mild abdominal discomfort in the left lower quadrant with the bleeding.  Patient otherwise with does not report any nausea or vomiting.  No headache, no nausea vomiting, no fevers or chills, no dizziness, no syncopal episode, no chest pain no shortness of breath.  Initial CT of the abdomen shows possible diverticular bleeding near the sigmoid colon.  Patient has a history of hemorrhoids however these are currently not bleeding.  Patient will need a stat CT of the abdomen if that is positive patient will need to go to [facility] for embolization through IR.  If that is negative patient will need colonoscopy through gastroenterology.  Monitor H&H and transfuse if hemoglobin drops below 7 or hematocrit drops below 21.  Reverse Xarelto with Kcentra.  Monitor cardiac status, monitor renal functions, monitor respiratory status, monitor neurological status.  Patient has hemiplegia from previous stroke on the right side.  Patient has established coronary artery disease.    I discussed the plan of care with patient, bedside RN, and emergency room physician and gastroenterology, I have spoken with SALMA Mckeon as well as Dr. Stoddard of GI .    Review of Systems  Review of Systems   Constitutional: Negative.  Negative for chills, diaphoresis and fever.   HENT: Negative.     Eyes: Negative.  Negative for double vision.   Respiratory:  Negative.  Negative for cough, hemoptysis and wheezing.    Cardiovascular: Negative.  Negative for chest pain, palpitations and leg swelling.   Gastrointestinal:  Positive for abdominal pain and blood in stool. Negative for constipation, diarrhea, heartburn, nausea and vomiting.   Genitourinary: Negative.  Negative for frequency, hematuria and urgency.   Musculoskeletal: Negative.  Negative for joint pain.   Skin: Negative.  Negative for itching and rash.   Neurological: Negative.  Negative for dizziness, focal weakness, seizures, loss of consciousness and headaches.   Endo/Heme/Allergies: Negative.  Does not bruise/bleed easily.   Psychiatric/Behavioral: Negative.  Negative for suicidal ideas. The patient is not nervous/anxious.    All other systems reviewed and are negative.    Past Medical History   has a past medical history of Arrhythmia (01/01/2009), Brain aneurysm s/p coil (01/01/2010), CAD (coronary artery disease) (06/14/2012), Dyslipidemia (06/14/2012), Hemorrhagic disorder ([facility]), History of CVA (cerebrovascular accident) (01/01/2010), Hypothyroid (06/14/2012), Indigestion, Right sided weakness (01/01/2010), and Stroke ([facility]) (2010).    Surgical History   has a past surgical history that includes other; tonsillectomy; abdominal hysterectomy total; zzz ep/ablation; tendon lenghtening (Right, 8/3/2022); and pr tenotomy perc toe single tendon (Right, 9/26/2022).     Family History  family history includes Cancer in her mother; Heart Attack in her mother; Heart Disease in her mother; Other in her brother; Thyroid in her mother.   Family history reviewed with patient. There is no family history that is pertinent to the chief complaint.     Social History   reports that she quit smoking about 48 years ago. Her smoking use included cigarettes. She has a 20.00 pack-year smoking history. She has never used smokeless tobacco. She reports current alcohol use. She reports that she does not use  drugs.    Allergies  Allergies   Allergen Reactions    Epinephrine Palpitations    Darvocet [Propoxyphene N-Apap]      Makes crazy    Demerol      Makes crazy       Medications  Prior to Admission Medications   Prescriptions Last Dose Informant Patient Reported? Taking?   Cholecalciferol (VITAMIN D3) 125 MCG (5000 UT) Cap 4/23/2023 at 2300 Patient Yes No   Sig: Take 5,000 Units by mouth every evening.   acetaminophen (TYLENOL) 500 MG Tab > 4 days at Unknown Patient Yes Yes   Sig: Take 1,000 mg by mouth every 6 hours as needed. Indications: Pain   lisinopril (PRINIVIL) 5 MG TABS 4/23/2023 at 2300 Patient No No   Sig: Take 1 Tab by mouth every day.   Patient taking differently: Take 5 mg by mouth every evening.   omeprazole (PRILOSEC) 20 MG Tablet Delayed Response delayed-release tablet 4/23/2023 at 2300 Patient Yes Yes   Sig: Take 20 mg by mouth every evening.   rivaroxaban (XARELTO) 20 MG Tab tablet 4/23/2023 at 2300 Patient Yes No   Sig: Take 20 mg by mouth every evening.      Facility-Administered Medications: None       Physical Exam  Temp:  [36.7 °C (98 °F)] 36.7 °C (98 °F)  Pulse:  [] 92  Resp:  [17-42] 17  BP: (107-143)/(54-79) 140/69  SpO2:  [94 %-99 %] 98 %  Blood Pressure : 117/79   Temperature: 36.7 °C (98 °F)   Pulse: 89   Respiration: 18   Pulse Oximetry: 98 %       Physical Exam  Vitals and nursing note reviewed. Exam conducted with a chaperone present.   Constitutional:       General: She is awake.      Appearance: Normal appearance. She is well-developed, well-groomed and normal weight. She is ill-appearing.   HENT:      Head: Normocephalic and atraumatic.      Jaw: There is normal jaw occlusion. No trismus.      Salivary Glands: Right salivary gland is not tender. Left salivary gland is not tender.      Right Ear: External ear normal.      Left Ear: External ear normal.      Nose: Nose normal.      Mouth/Throat:      Mouth: Mucous membranes are moist.      Pharynx: Oropharynx is clear.   Eyes:       General: Lids are normal. Vision grossly intact.      Extraocular Movements: Extraocular movements intact.      Conjunctiva/sclera: Conjunctivae normal.      Right eye: Right conjunctiva is not injected. No exudate.     Left eye: Left conjunctiva is not injected. No exudate.     Pupils: Pupils are equal, round, and reactive to light.   Neck:      Thyroid: No thyroid mass.      Vascular: No hepatojugular reflux or JVD.      Trachea: No abnormal tracheal secretions or tracheal deviation.   Cardiovascular:      Rate and Rhythm: Normal rate and regular rhythm. Occasional Extrasystoles are present.     Pulses: Normal pulses.      Heart sounds: Normal heart sounds. No murmur heard.    No friction rub.   Pulmonary:      Effort: Pulmonary effort is normal.      Breath sounds: Examination of the right-lower field reveals decreased breath sounds. Examination of the left-lower field reveals decreased breath sounds. Decreased breath sounds present. No wheezing or rhonchi.   Abdominal:      General: Abdomen is flat.      Palpations: Abdomen is soft.      Tenderness: There is no abdominal tenderness. There is no right CVA tenderness or left CVA tenderness.      Hernia: No hernia is present.   Genitourinary:     Rectum: Guaiac result positive.   Musculoskeletal:      Cervical back: Full passive range of motion without pain, normal range of motion and neck supple. No rigidity. No muscular tenderness.      Right lower leg: No edema.      Left lower leg: No edema.   Lymphadenopathy:      Head:      Right side of head: No submental adenopathy.      Left side of head: No submental adenopathy.      Cervical:      Right cervical: No superficial cervical adenopathy.     Left cervical: No superficial cervical adenopathy.      Upper Body:      Right upper body: No supraclavicular adenopathy.      Left upper body: No supraclavicular adenopathy.   Skin:     General: Skin is warm and dry.      Capillary Refill: Capillary refill takes less  than 2 seconds.      Coloration: Skin is not cyanotic or pale.      Findings: No abrasion or bruising.   Neurological:      General: No focal deficit present.      Mental Status: She is alert and oriented to person, place, and time. Mental status is at baseline.      GCS: GCS eye subscore is 4. GCS verbal subscore is 5. GCS motor subscore is 6.      Cranial Nerves: No cranial nerve deficit.      Sensory: No sensory deficit.      Motor: Weakness, atrophy and abnormal muscle tone present.      Coordination: Coordination abnormal. Finger-Nose-Finger Test abnormal and Heel to Shin Test abnormal. Impaired rapid alternating movements.      Gait: Gait abnormal.      Deep Tendon Reflexes:      Reflex Scores:       Tricep reflexes are 2+ on the right side and 2+ on the left side.       Bicep reflexes are 2+ on the right side and 2+ on the left side.       Brachioradialis reflexes are 2+ on the right side and 2+ on the left side.       Patellar reflexes are 2+ on the right side and 2+ on the left side.       Achilles reflexes are 2+ on the right side and 2+ on the left side.     Comments: Right-sided hemiplegia   Psychiatric:         Attention and Perception: Attention and perception normal.         Mood and Affect: Mood is anxious.         Speech: Speech normal.         Behavior: Behavior is cooperative.         Thought Content: Thought content normal.         Cognition and Memory: Cognition and memory normal.         Judgment: Judgment normal.       Laboratory:  Recent Labs     04/24/23  1420   WBC 7.5   RBC 3.65*   HEMOGLOBIN 10.8*   HEMATOCRIT 32.9*   MCV 90.1   MCH 29.6   MCHC 32.8*   RDW 45.1   PLATELETCT 255   MPV 9.2     Recent Labs     04/24/23  1420   SODIUM 141   POTASSIUM 4.4   CHLORIDE 105   CO2 24   GLUCOSE 121*   BUN 20   CREATININE 0.72   CALCIUM 9.2     Recent Labs     04/24/23  1420   ALTSGPT 9   ASTSGOT 12   ALKPHOSPHAT 74   TBILIRUBIN 0.3   LIPASE 30   GLUCOSE 121*     Recent Labs     04/24/23  1420   APTT  32.7   INR 2.33*     Recent Labs     04/24/23  1420   NTPROBNP 66         Recent Labs     04/24/23  1420   TROPONINT <6       Imaging:  CTA ABDOMEN PELVIS W & W/O POST PROCESS   Final Result      1.  No areas of active arterial extravasation. No hyperdense material in the sigmoid colon on today's study.   2.  Extensive colonic diverticulosis.   3.  No acute inflammatory process in the abdomen or pelvis.   4.  Right hepatic lesion described on the recent CT is not well seen on this arterial phase study. Consider evaluation with contrast-enhanced renal protocol MRI.      DX-CHEST-PORTABLE (1 VIEW)   Final Result      No radiographic evidence of acute cardiopulmonary process.      Radiopaque material over the right axilla is indeterminate and could even be extrinsic to the patient.      CT-ABDOMEN-PELVIS WITH   Final Result      1.  Diverticulosis without diverticulitis.      2.  Dense material within the sigmoid colon likely representing extravasated contrast/active bleeding, assuming patient has not ingested any radiopaque medication.      3.  Indeterminant 15 mm low-attenuation lesion in the posterior segment right hepatic lobe potentially representing a hemangioma. Recommend follow-up imaging.      4.  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      5.  Aortic atherosclerosis without aneurysm.          X-Ray:  I have personally reviewed the images and compared with prior images.  EKG:  I have personally reviewed the images and compared with prior images.    Assessment/Plan:  Justification for Admission Status  I anticipate this patient will require at least two midnights for appropriate medical management, necessitating inpatient admission because patient has massive gastrointestinal bleeding while on blood thinners.  CT of the abdomen at this point shows colonic bleeding.  Patient at this point will need identification of where she is bleeding, intervention to stop the bleeding, reversal of Xarelto with Kcentra, and  monitoring of blood counts replacement of blood that is lost and monitoring of renal functions cardiac functions and neurological functions this will not be able to be accomplished in less than 48 hours    Patient will need a Telemetry bed on MEDICAL service .  The need is secondary to massive gastrointestinal bleeding.    * GIB (gastrointestinal bleeding)- (present on admission)  Assessment & Plan  Patient suddenly developed lower GI bleeding.  Patient is reporting rectal bleeding at least 5-6 times today.  The patient is ongoing with her bleeding.  At this point we will do a CT of the abdomen.  If that is positive patient will need to go up to the [facility] hospital for radiology evaluation and embolization.  If it is negative GI plans to do a colonoscopy on her tomorrow.  Initial CT of the abdomen does show colonic thickening with possible bleeding but did not localize the vessel.    External hemorrhoid- (present on admission)  Assessment & Plan  History of external hemorrhoids which currently are not bleeding.    CAD (coronary artery disease)- (present on admission)  Assessment & Plan  Optimize medical management.  Patient was on Xarelto which will need to be reversed given GI bleeding  Currently reports no chest pain    DNR (do not resuscitate)  Assessment & Plan  After discussing with the patient patient says she does not want to be resuscitated in the case she becomes terminally ill.  CODE STATUS will be updated.    Gastroesophageal reflux disease- (present on admission)  Assessment & Plan  PPI therapy    Dyslipidemia- (present on admission)  Assessment & Plan  Currently not on a statin  Check a fasting lipid panel    Hypothyroid- (present on admission)  Assessment & Plan  Patient currently does not take any Synthroid supplementation  Check a TSH level    History of CVA (cerebrovascular accident)- (present on admission)  Assessment & Plan  History of CVA with right-sided hemiplegia  Patient wears a brace on her  right lower extremity  Patient uses Xarelto to prevent further strokes  Xarelto will need to be reversed with Kcentra right now given the fact the patient is ongoing with bleeding        VTE prophylaxis: SCDs/TEDs

## 2023-04-25 NOTE — ASSESSMENT & PLAN NOTE
Patient suddenly developed lower GI bleeding.  Patient is reporting rectal bleeding at least 5-6 times today.  The patient is ongoing with her bleeding.  At this point we will do a CT of the abdomen.  If that is positive patient will need to go up to the main hospital for radiology evaluation and embolization.  If it is negative GI plans to do a colonoscopy on her tomorrow.  Initial CT of the abdomen does show colonic thickening with possible bleeding but did not localize the vessel.

## 2023-04-25 NOTE — PROGRESS NOTES
Gastroenterology Progress Note               Author:  Robert Wiggins M.D. with JAMI Mckeon Date & Time Created: 4/25/2023 2:34 PM       Patient ID:  Name:             Abbi King  YOB: 1945  Age:                 77 y.o.  female  MRN:               [id number]      Referring Provider:  Kelley Lakhani MD      Presenting Chief Complaint:  Hematochezia      History of Present Illness:    This is a very pleasant 77 y.o. female with the past medical history that includes atrial fibrillation, aneurysm status post coiling, coronary artery disease, dyslipidemia, CVA, GERD who is seen in consultation for complaints hematochezia.  She had bright red blood passage per rectum that started about 2 days ago.  Over the last 24 hours the volume and frequency of the hematochezia has been quite a bit more.  She reports 6 or 7 bowel movements a day.  She denies any abdominal pain, nausea, vomiting.  She does feel somewhat weak and lightheaded but not dizzy.  She denies syncope.  This is never happened before.  She has had a colonoscopy but many years ago when she was in her 20s.  She takes Xarelto last dose was last night at 11 PM.    CBC today with hemoglobin 10.8, hematocrit 32.9, MCV 90.1.  CMP with glucose 121, otherwise normal.  CT abdomen pelvis with contrast demonstrates dense material within the sigmoid colon likely representing extravasated contrast/active bleeding.  She is also found to have indeterminate 15 mm low-attenuation lesion in the posterior right hepatic lobe, and incidental findings.    Interval History  4/25/2023: Patient with downtrending hgb but stabilized over last two draws. CTA yesterday did not demonstrate an active bleed. She did not have any further bloody stools or Bms since yesterday. She did not have a bowel prep completed last night.     Review of Systems:  Review of Systems   Constitutional:  Positive for malaise/fatigue. Negative for chills and fever.   HENT:   Negative for ear discharge and hearing loss.    Respiratory:  Negative for cough and hemoptysis.    Cardiovascular:  Negative for chest pain and palpitations.   Gastrointestinal:  Negative for abdominal pain and blood in stool.   Neurological:  Negative for dizziness and headaches.   Endo/Heme/Allergies:  Negative for environmental allergies and polydipsia.   Psychiatric/Behavioral:  Negative for memory loss. The patient does not have insomnia.            Past Medical History:  Past Medical History:   Diagnosis Date    Arrhythmia 01/01/2009    hx of AFIB    Brain aneurysm s/p coil 01/01/2010    CAD (coronary artery disease) 06/14/2012    Dyslipidemia 06/14/2012    Hemorrhagic disorder (HCC)     History of CVA (cerebrovascular accident) 01/01/2010    pt has right side weakness/upper ext. flaccid    Hypothyroid 06/14/2012    Indigestion     GERD    Right sided weakness 01/01/2010    Stroke (HCC) 2010     Active Hospital Problems    Diagnosis     GIB (gastrointestinal bleeding) [K92.2]     DNR (do not resuscitate) [Z66]     External hemorrhoid [K64.4]     Gastroesophageal reflux disease [K21.9]     Hypothyroid [E03.9]     History of CVA (cerebrovascular accident) [Z86.73]     Dyslipidemia [E78.5]     CAD (coronary artery disease) [I25.10]          Past Surgical History:  Past Surgical History:   Procedure Laterality Date    WA TENOTOMY PERC TOE SINGLE TENDON Right 9/26/2022    Procedure: RIGHT SECOND, THIRD, FOURTH GREAT TOE FLEXOR TENOTOMY;  Surgeon: Jaden Dean M.D.;  Location: [facility] [place];  Service: Orthopedics    TENDON LENGHTENING Right 8/3/2022    Procedure: RIGHT ACHILLES TENDON LENGTHENING;  Surgeon: Jaden Dean M.D.;  Location: [facility];  Service: Orthopedics    ABDOMINAL HYSTERECTOMY TOTAL      OTHER      coil aneurysm    TONSILLECTOMY      ZZZ EP/ABLATION      cardiac ablation x2           Hospital Medications:  Current Facility-Administered Medications    Medication Dose Frequency Provider Last Rate Last Admin    acetaminophen (Tylenol) tablet 325 mg  325 mg Q6HRS PRN Luciano Hernandez M.D.        phenylephrine-cocoa butter (PREPARATION H) suppository 1 Suppository  1 Suppository Q6HRS PRN Luciano Hernandez M.D.        NS infusion   Continuous Zeny Lakhani M.D. 83 mL/hr at 04/25/23 0932 New Bag at 04/25/23 0932    Pharmacy Consult Request ...Pain Management Review 1 Each  1 Each PHARMACY TO DOSE Zeny Lakhani M.D.        oxyCODONE immediate-release (ROXICODONE) tablet 5 mg  5 mg Q3HRS PRN Zeny Lakhani M.D.        Or    oxyCODONE immediate release (ROXICODONE) tablet 10 mg  10 mg Q3HRS PRN Zeny Lakhani M.D.        Or    morphine 4 MG/ML injection 4 mg  4 mg Q3HRS PRN Zeny Lakhani M.D.        labetalol (NORMODYNE/TRANDATE) injection 10 mg  10 mg Q4HRS PRN Zeny Lakhani M.D.        ondansetron (ZOFRAN) syringe/vial injection 4 mg  4 mg Q4HRS PRN Zeny Lakhani M.D.        ondansetron (ZOFRAN ODT) dispertab 4 mg  4 mg Q4HRS PRN Zeny Lakhani M.D.        pantoprazole (Protonix) injection 40 mg  40 mg BID Zeny Lakhani M.D.   40 mg at 04/25/23 0511   Last reviewed on 4/24/2023  4:25 PM by Zeny Lakhani M.D.       Current Outpatient Medications:  Medications Prior to Admission   Medication Sig Dispense Refill Last Dose    acetaminophen (TYLENOL) 500 MG Tab Take 1,000 mg by mouth every 6 hours as needed. Indications: Pain   > 4 days at Unknown    omeprazole (PRILOSEC) 20 MG Tablet Delayed Response delayed-release tablet Take 20 mg by mouth every evening.   4/23/2023 at 2300    rivaroxaban (XARELTO) 20 MG Tab tablet Take 20 mg by mouth every evening.   4/23/2023 at 2300    Cholecalciferol (VITAMIN D3) 125 MCG (5000 UT) Cap Take 5,000 Units by mouth every evening.   4/23/2023 at 2300    lisinopril (PRINIVIL) 5 MG TABS Take 1 Tab by mouth every day. (Patient taking differently: Take 5 mg by mouth every evening.) 90 Tab 3 4/23/2023 at 2300           Medication  Allergies:  Allergies   Allergen Reactions    Epinephrine Palpitations    Darvocet [Propoxyphene N-Apap]      Makes crazy    Demerol      Makes crazy         Family Medical History:  Family History   Problem Relation Age of Onset    Cancer Mother         lung    Heart Disease Mother     Heart Attack Mother     Thyroid Mother     Other Brother          Social History:  Social History     Socioeconomic History    Marital status:      Spouse name: Not on file    Number of children: Not on file    Years of education: Not on file    Highest education level: Not on file   Occupational History    Not on file   Tobacco Use    Smoking status: Former     Packs/day: 1.00     Years: 20.00     Pack years: 20.00     Types: Cigarettes     Quit date: 6/3/1974     Years since quittin.9    Smokeless tobacco: Never    Tobacco comments:     quit 20 years ago smoked for 20 years less then pack u day   Vaping Use    Vaping Use: Never used   Substance and Sexual Activity    Alcohol use: Yes     Comment: drink every other day    Drug use: No    Sexual activity: Yes     Partners: Male     Comment:  /can do all ADLs by herself   Other Topics Concern    Not on file   Social History Narrative    Not on file     Social Determinants of Health     Financial Resource Strain: Not on file   Food Insecurity: Not on file   Transportation Needs: Not on file   Physical Activity: Not on file   Stress: Not on file   Social Connections: Not on file   Intimate Partner Violence: Not on file   Housing Stability: Not on file         Vital signs:  Weight/BMI: Body mass index is 24.23 kg/m².  BP (!) 141/67   Pulse 86   Temp 36.8 °C (98.2 °F) (Oral)   Resp 18   Wt 60.1 kg (132 lb 7.9 oz)   SpO2 95%   Vitals:    23 0418 23 0733 23 0752 23 1220   BP: 101/54  118/72 (!) 141/67   Pulse: 77 90 84 86   Resp: 20 18 18 18   Temp: 36.8 °C (98.3 °F)  36.6 °C (97.9 °F) 36.8 °C (98.2 °F)   TempSrc: Oral  Oral Oral   SpO2: 99%  96% 98% 95%   Weight: 60.3 kg (132 lb 15 oz)  60.1 kg (132 lb 7.9 oz)      Oxygen Therapy:  Pulse Oximetry: 95 %, O2 (LPM): 0, O2 Delivery Device: None - Room Air    Intake/Output Summary (Last 24 hours) at 4/25/2023 1435  Last data filed at 4/25/2023 1000  Gross per 24 hour   Intake 600 ml   Output 300 ml   Net 300 ml         Physical Exam:  Physical Exam  Vitals and nursing note reviewed.   Constitutional:       Appearance: Normal appearance.   HENT:      Head: Normocephalic and atraumatic.      Right Ear: External ear normal.      Left Ear: External ear normal.      Nose: Nose normal. No congestion.      Mouth/Throat:      Mouth: Mucous membranes are moist.      Pharynx: Oropharynx is clear.   Eyes:      General: No scleral icterus.     Extraocular Movements: Extraocular movements intact.      Pupils: Pupils are equal, round, and reactive to light.   Cardiovascular:      Rate and Rhythm: Normal rate and regular rhythm.      Pulses: Normal pulses.      Heart sounds: Normal heart sounds. No murmur heard.  Pulmonary:      Effort: Pulmonary effort is normal. No respiratory distress.      Breath sounds: Normal breath sounds.   Abdominal:      General: Abdomen is flat. Bowel sounds are normal. There is no distension.      Palpations: Abdomen is soft.      Tenderness: There is no abdominal tenderness.   Musculoskeletal:      Cervical back: Neck supple.      Right lower leg: No edema.      Left lower leg: No edema.   Lymphadenopathy:      Cervical: No cervical adenopathy.   Skin:     General: Skin is warm and dry.   Neurological:      General: No focal deficit present.      Mental Status: She is alert and oriented to person, place, and time.   Psychiatric:         Thought Content: Thought content normal.         Judgment: Judgment normal.         Labs:  Recent Labs     04/24/23  1420 04/25/23  0659   SODIUM 141 139   POTASSIUM 4.4 4.0   CHLORIDE 105 111   CO2 24 20   BUN 20 14   CREATININE 0.72 0.62   MAGNESIUM  --  1.8    PHOSPHORUS  --  3.5   CALCIUM 9.2 8.5       Recent Labs     23  1420 23  0659   ALTSGPT 9  --    ASTSGOT 12  --    ALKPHOSPHAT 74  --    TBILIRUBIN 0.3  --    LIPASE 30  --    GLUCOSE 121* 94       Recent Labs     23  1420 23  0659   WBC 7.5 5.0   NEUTSPOLYS 65.20  --    LYMPHOCYTES 26.00  --    MONOCYTES 7.40  --    EOSINOPHILS 0.70  --    BASOPHILS 0.40  --    ASTSGOT 12  --    ALTSGPT 9  --    ALKPHOSPHAT 74  --    TBILIRUBIN 0.3  --        Recent Labs     23  1420 23  2220 23  0659   RBC 3.65*  --  2.61*   HEMOGLOBIN 10.8* 8.7* 7.8*  7.8*   HEMATOCRIT 32.9*  --  24.3*   PLATELETCT 255  --  201   PROTHROMBTM 24.9* 19.4*  --    APTT 32.7 30.3  --    INR 2.33* 1.67*  --        Recent Results (from the past 24 hour(s))   EKG (NOW)    Collection Time: 23  2:38 PM   Result Value Ref Range    Report       [facility]    Test Date:  2023  Pt Name:    YASMEEN BASURTO                 Department: [facility]  MRN:        [id number]                      Room:       [place] [address]  Gender:     Female                       Technician: ROSALIA  :        1945                   Requested By:CHARISSE COTTO  Order #:    [id number]                    Reading MD: Charisse Cotto    Measurements  Intervals                                Axis  Rate:       84                           P:          79  IN:         156                          QRS:        54  QRSD:       77                           T:          54  QT:         355  QTc:        420    Interpretive Statements  Sinus rhythm  No ST elevation or depression noted. No arrhythmias noted.  Compared to ECG 2022 12:51:10  No significant changes  Electronically Signed On 2023 14:56:53 PDT by Charisse Cotto     LACTIC ACID    Collection Time: 23  2:47 PM   Result Value Ref Range    Lactic Acid 1.7 0.5 - 2.0 mmol/L   BLOOD CULTURE    Collection Time: 23  2:47 PM    Specimen: Peripheral; Blood    Result Value Ref Range    Significant Indicator NEG     Source BLD     Site PERIPHERAL     Culture Result       No Growth  Note: Blood cultures are incubated for 5 days and  are monitored continuously.Positive blood cultures  are called to the RN and reported as soon as  they are identified.  Blood culture testing and Gram stain, if indicated, are  performed at [facility], [address] [place] [address].  Positive blood cultures are  sent to [facility], [address] [place] [address], for organism identification and  susceptibility testing.     BLOOD CULTURE    Collection Time: 04/24/23  2:55 PM    Specimen: Peripheral; Blood   Result Value Ref Range    Significant Indicator NEG     Source BLD     Site PERIPHERAL     Culture Result       No Growth  Note: Blood cultures are incubated for 5 days and  are monitored continuously.Positive blood cultures  are called to the RN and reported as soon as  they are identified.  Blood culture testing and Gram stain, if indicated, are  performed at [facility], [address] [place] [address].  Positive blood cultures are  sent to [facility], [address] [place] [address], for organism identification and  susceptibility testing.     Basic TEG    Collection Time: 04/24/23 10:20 PM   Result Value Ref Range    Reaction Time Initial-R 4.7 4.6 - 9.1 min    React Time Initial Hep 5.2 4.3 - 8.3 min    Clot Kinetics-K 1.0 0.8 - 2.1 min    Clot Angle-Angle 75.5 63.0 - 78.0 degrees    Maximum Clot Strength-MA 62.7 52.0 - 69.0 mm    TEG Functional Fibrinogen(MA) 19.4 15.0 - 32.0 mm    Lysis 30 minutes-LY30 0.0 0.0 - 2.6 %    TEG Algorithm Link Algorithm    APTT(PTT)    Collection Time: 04/24/23 10:20 PM   Result Value Ref Range    APTT 30.3 24.7 - 36.0 sec   Prothrombin time (INR)    Collection Time: 04/24/23 10:20 PM   Result Value Ref Range    PT 19.4 (H) 12.0 - 14.6 sec    INR 1.67  (H) 0.87 - 1.13   HGB    Collection Time: 04/24/23 10:20 PM   Result Value Ref Range    Hemoglobin 8.7 (L) 12.0 - 16.0 g/dL   HGB (Hemoglobin) for 48 hours    Collection Time: 04/25/23  6:59 AM   Result Value Ref Range    Hemoglobin 7.8 (L) 12.0 - 16.0 g/dL   Renal Function Panel    Collection Time: 04/25/23  6:59 AM   Result Value Ref Range    Sodium 139 135 - 145 mmol/L    Potassium 4.0 3.6 - 5.5 mmol/L    Chloride 111 96 - 112 mmol/L    Co2 20 20 - 33 mmol/L    Glucose 94 65 - 99 mg/dL    Creatinine 0.62 0.50 - 1.40 mg/dL    Bun 14 8 - 22 mg/dL    Calcium 8.5 8.4 - 10.2 mg/dL    Phosphorus 3.5 2.5 - 4.5 mg/dL    Albumin 3.5 3.2 - 4.9 g/dL   MAGNESIUM    Collection Time: 04/25/23  6:59 AM   Result Value Ref Range    Magnesium 1.8 1.5 - 2.5 mg/dL   CBC WITHOUT DIFFERENTIAL    Collection Time: 04/25/23  6:59 AM   Result Value Ref Range    WBC 5.0 4.8 - 10.8 K/uL    RBC 2.61 (L) 4.20 - 5.40 M/uL    Hemoglobin 7.8 (L) 12.0 - 16.0 g/dL    Hematocrit 24.3 (L) 37.0 - 47.0 %    MCV 93.1 81.4 - 97.8 fL    MCH 29.9 27.0 - 33.0 pg    MCHC 32.1 (L) 33.6 - 35.0 g/dL    RDW 47.4 35.9 - 50.0 fL    Platelet Count 201 164 - 446 K/uL    MPV 9.6 9.0 - 12.9 fL   CORRECTED CALCIUM    Collection Time: 04/25/23  6:59 AM   Result Value Ref Range    Correct Calcium 8.9 8.5 - 10.5 mg/dL   ESTIMATED GFR    Collection Time: 04/25/23  6:59 AM   Result Value Ref Range    GFR (CKD-EPI) 92 >60 mL/min/1.73 m 2         Radiology Review:  CTA ABDOMEN PELVIS W & W/O POST PROCESS   Final Result      1.  No areas of active arterial extravasation. No hyperdense material in the sigmoid colon on today's study.   2.  Extensive colonic diverticulosis.   3.  No acute inflammatory process in the abdomen or pelvis.   4.  Right hepatic lesion described on the recent CT is not well seen on this arterial phase study. Consider evaluation with contrast-enhanced renal protocol MRI.      DX-CHEST-PORTABLE (1 VIEW)   Final Result      No radiographic evidence of  acute cardiopulmonary process.      Radiopaque material over the right axilla is indeterminate and could even be extrinsic to the patient.      CT-ABDOMEN-PELVIS WITH   Final Result      1.  Diverticulosis without diverticulitis.      2.  Dense material within the sigmoid colon likely representing extravasated contrast/active bleeding, assuming patient has not ingested any radiopaque medication.      3.  Indeterminant 15 mm low-attenuation lesion in the posterior segment right hepatic lobe potentially representing a hemangioma. Recommend follow-up imaging.      4.  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      5.  Aortic atherosclerosis without aneurysm.            MDM (Data Review):   -Records reviewed and summarized in current documentation  -I personally reviewed and interpreted the laboratory results  -I personally reviewed the radiology images        Medical Decision Making, by Problem:  Active Hospital Problems    Diagnosis     GIB (gastrointestinal bleeding) [K92.2]     DNR (do not resuscitate) [Z66]     External hemorrhoid [K64.4]     Gastroesophageal reflux disease [K21.9]     Hypothyroid [E03.9]     History of CVA (cerebrovascular accident) [Z86.73]     Dyslipidemia [E78.5]     CAD (coronary artery disease) [I25.10]            Assessment/Recommendations:  77-year-old female patient with hematochezia, acute onset without pain.  CT scan with sigmoid dense material likely contrast or blood.  CTA negative for bleed. Hgb 7.8 now    Assessment:  -Hematochezia-likely secondary to diverticular bleed. No further bleeding since 4/24 PM  -Acute posthemorrhagic anemia  -Chronic anticoagulation with Xarelto last dose evening 4/23/2023  -Coronary artery disease  -History of CVA    Plan:  -Advance diet  -Hold on colonoscopy as she is not currently bleeding. This seems to have resolved on its own.   -Ok for discharge from GI standpoint this evening if recheck hemoglobin is stable and no further bleeding. Strict ER return  precautions if bleeding starts again  -Outpatient colonoscopy for colon cancer screening  -Resume Xarelto in 24-48 hrs if no further bleeding    GI will sign off      GI Attending -    Patient seen, examined, chart reviewed and case discussed and plan arrived at with A.P.R.N.  Agree with this note.    No further bleeding and hemoglobin stable.  Will sign off.    Robert Wiggins M.D.    Donny Franco, [email or web address].      Thank you for inviting me to participate in the care of this patient. Please do not hesitate to call GI consultants with additional questions/concerns or changes in the patient's clinical status at [phone].      Core Quality Measures   Reviewed items:  Labs, Medications and Radiology reports reviewed

## 2023-04-25 NOTE — PROGRESS NOTES
"RN printed bloodless documents and provided bloodless education to pt. Pt has now decided to not be bloodless. Pt states \"I will accept a blood transfusion if I need one\". Bloodless status updated to pt EMR.  "

## 2023-04-25 NOTE — DISCHARGE PLANNING
"Patient rolled back to observation/outpatient status per attending MD determination Luciano Dunaway and UR committee MD secondary review Coy Grady Condition code 44 completed.\"      "

## 2023-04-25 NOTE — ASSESSMENT & PLAN NOTE
Optimize medical management.  Patient was on Xarelto which will need to be reversed given GI bleeding  Currently reports no chest pain

## 2023-04-29 LAB
BACTERIA BLD CULT: NORMAL
BACTERIA BLD CULT: NORMAL
SIGNIFICANT IND 70042: NORMAL
SIGNIFICANT IND 70042: NORMAL
SITE SITE: NORMAL
SITE SITE: NORMAL
SOURCE SOURCE: NORMAL
SOURCE SOURCE: NORMAL

## 2023-11-17 ENCOUNTER — APPOINTMENT (OUTPATIENT)
Dept: RADIOLOGY | Facility: MEDICAL CENTER | Age: 78
End: 2023-11-17
Attending: EMERGENCY MEDICINE
Payer: MEDICARE

## 2023-11-17 ENCOUNTER — HOSPITAL ENCOUNTER (EMERGENCY)
Facility: MEDICAL CENTER | Age: 78
End: 2023-11-17
Attending: EMERGENCY MEDICINE
Payer: MEDICARE

## 2023-11-17 VITALS
TEMPERATURE: 97.3 F | DIASTOLIC BLOOD PRESSURE: 68 MMHG | HEIGHT: 62 IN | SYSTOLIC BLOOD PRESSURE: 124 MMHG | HEART RATE: 82 BPM | RESPIRATION RATE: 18 BRPM | OXYGEN SATURATION: 98 % | BODY MASS INDEX: 23.85 KG/M2 | WEIGHT: 129.63 LBS

## 2023-11-17 DIAGNOSIS — R07.9 CHEST PAIN, UNSPECIFIED TYPE: ICD-10-CM

## 2023-11-17 LAB
ALBUMIN SERPL BCP-MCNC: 4 G/DL (ref 3.2–4.9)
ALBUMIN/GLOB SERPL: 1.2 G/DL
ALP SERPL-CCNC: 117 U/L (ref 30–99)
ALT SERPL-CCNC: 24 U/L (ref 2–50)
ANION GAP SERPL CALC-SCNC: 15 MMOL/L (ref 7–16)
APTT PPP: 43.9 SEC (ref 24.7–36)
AST SERPL-CCNC: 19 U/L (ref 12–45)
BASOPHILS # BLD AUTO: 0.2 % (ref 0–1.8)
BASOPHILS # BLD: 0.01 K/UL (ref 0–0.12)
BILIRUB SERPL-MCNC: 0.7 MG/DL (ref 0.1–1.5)
BUN SERPL-MCNC: 10 MG/DL (ref 8–22)
CALCIUM ALBUM COR SERPL-MCNC: 9.1 MG/DL (ref 8.5–10.5)
CALCIUM SERPL-MCNC: 9.1 MG/DL (ref 8.4–10.2)
CHLORIDE SERPL-SCNC: 102 MMOL/L (ref 96–112)
CO2 SERPL-SCNC: 20 MMOL/L (ref 20–33)
CREAT SERPL-MCNC: 0.56 MG/DL (ref 0.5–1.4)
EKG IMPRESSION: NORMAL
EKG IMPRESSION: NORMAL
EOSINOPHIL # BLD AUTO: 0.04 K/UL (ref 0–0.51)
EOSINOPHIL NFR BLD: 0.9 % (ref 0–6.9)
ERYTHROCYTE [DISTWIDTH] IN BLOOD BY AUTOMATED COUNT: 44.7 FL (ref 35.9–50)
GFR SERPLBLD CREATININE-BSD FMLA CKD-EPI: 94 ML/MIN/1.73 M 2
GLOBULIN SER CALC-MCNC: 3.3 G/DL (ref 1.9–3.5)
GLUCOSE SERPL-MCNC: 102 MG/DL (ref 65–99)
HCT VFR BLD AUTO: 40.2 % (ref 37–47)
HGB BLD-MCNC: 13.1 G/DL (ref 12–16)
IMM GRANULOCYTES # BLD AUTO: 0.01 K/UL (ref 0–0.11)
IMM GRANULOCYTES NFR BLD AUTO: 0.2 % (ref 0–0.9)
INR PPP: 1.62 (ref 0.87–1.13)
LIPASE SERPL-CCNC: 42 U/L (ref 11–82)
LYMPHOCYTES # BLD AUTO: 0.68 K/UL (ref 1–4.8)
LYMPHOCYTES NFR BLD: 16 % (ref 22–41)
MCH RBC QN AUTO: 28.7 PG (ref 27–33)
MCHC RBC AUTO-ENTMCNC: 32.6 G/DL (ref 32.2–35.5)
MCV RBC AUTO: 88.2 FL (ref 81.4–97.8)
MONOCYTES # BLD AUTO: 0.52 K/UL (ref 0–0.85)
MONOCYTES NFR BLD AUTO: 12.2 % (ref 0–13.4)
NEUTROPHILS # BLD AUTO: 2.99 K/UL (ref 1.82–7.42)
NEUTROPHILS NFR BLD: 70.5 % (ref 44–72)
NRBC # BLD AUTO: 0 K/UL
NRBC BLD-RTO: 0 /100 WBC (ref 0–0.2)
PLATELET # BLD AUTO: 276 K/UL (ref 164–446)
PMV BLD AUTO: 8.8 FL (ref 9–12.9)
POTASSIUM SERPL-SCNC: 3.6 MMOL/L (ref 3.6–5.5)
PROT SERPL-MCNC: 7.3 G/DL (ref 6–8.2)
PROTHROMBIN TIME: 20 SEC (ref 12–14.6)
RBC # BLD AUTO: 4.56 M/UL (ref 4.2–5.4)
SODIUM SERPL-SCNC: 137 MMOL/L (ref 135–145)
TROPONIN T SERPL-MCNC: 10 NG/L (ref 6–19)
TROPONIN T SERPL-MCNC: 11 NG/L (ref 6–19)
WBC # BLD AUTO: 4.3 K/UL (ref 4.8–10.8)

## 2023-11-17 PROCEDURE — A9270 NON-COVERED ITEM OR SERVICE: HCPCS | Performed by: EMERGENCY MEDICINE

## 2023-11-17 PROCEDURE — 80053 COMPREHEN METABOLIC PANEL: CPT

## 2023-11-17 PROCEDURE — 93005 ELECTROCARDIOGRAM TRACING: CPT

## 2023-11-17 PROCEDURE — 71045 X-RAY EXAM CHEST 1 VIEW: CPT

## 2023-11-17 PROCEDURE — 99285 EMERGENCY DEPT VISIT HI MDM: CPT

## 2023-11-17 PROCEDURE — 85730 THROMBOPLASTIN TIME PARTIAL: CPT

## 2023-11-17 PROCEDURE — 93005 ELECTROCARDIOGRAM TRACING: CPT | Performed by: EMERGENCY MEDICINE

## 2023-11-17 PROCEDURE — 84484 ASSAY OF TROPONIN QUANT: CPT

## 2023-11-17 PROCEDURE — 36415 COLL VENOUS BLD VENIPUNCTURE: CPT

## 2023-11-17 PROCEDURE — 83690 ASSAY OF LIPASE: CPT

## 2023-11-17 PROCEDURE — 85610 PROTHROMBIN TIME: CPT

## 2023-11-17 PROCEDURE — 85025 COMPLETE CBC W/AUTO DIFF WBC: CPT

## 2023-11-17 PROCEDURE — 700102 HCHG RX REV CODE 250 W/ 637 OVERRIDE(OP): Performed by: EMERGENCY MEDICINE

## 2023-11-17 RX ORDER — ASPIRIN 300 MG/1
300 SUPPOSITORY RECTAL ONCE
Status: COMPLETED | OUTPATIENT
Start: 2023-11-17 | End: 2023-11-17

## 2023-11-17 RX ORDER — ASPIRIN 81 MG/1
324 TABLET, CHEWABLE ORAL ONCE
Status: COMPLETED | OUTPATIENT
Start: 2023-11-17 | End: 2023-11-17

## 2023-11-17 RX ORDER — LISINOPRIL 20 MG/1
20 TABLET ORAL DAILY
COMMUNITY

## 2023-11-17 RX ADMIN — ASPIRIN 324 MG: 81 TABLET, CHEWABLE ORAL at 08:21

## 2023-11-17 RX ADMIN — LIDOCAINE HYDROCHLORIDE 30 ML: 20 SOLUTION OROPHARYNGEAL at 08:21

## 2023-11-17 ASSESSMENT — HEART SCORE
HEART SCORE: 6
RISK FACTORS: >2 RISK FACTORS OR HX OF ATHEROSCLEROTIC DISEASE
TROPONIN: LESS THAN OR EQUAL TO NORMAL LIMIT
ECG: SIGNIFICANT ST-DEPRESSION
AGE: 65+
HISTORY: SLIGHTLY SUSPICIOUS

## 2023-11-17 ASSESSMENT — FIBROSIS 4 INDEX: FIB4 SCORE: 1.53

## 2023-11-17 NOTE — ED NOTES
"Pt states she woke up at 03:30 with \"crushing chest pain.\" Pt states she has had a heart attack, two ablations, two strokes and two aneurisms in the past.   "

## 2023-11-17 NOTE — ED PROVIDER NOTES
"ED Provider Note    CHIEF COMPLAINT  Chief Complaint   Patient presents with    Chest Pain     Woke from sleep this AM w/ \"crushing chest pain\"; pt has extensive cardiac hx       HPI/ROS      Abbi King is a 78 y.o. female who presents with chest pain.  The patient states the pain started this morning and it woke her up from her sleep.  She states it is substernal in location described as burning and pressure.  She does not have any associated shortness of breath.  She has had some nausea but no vomiting.  She has not had any diaphoresis.  She has been under a lot of stress recently as her  is getting discharged from the hospital after he unfortunately suffered a small stroke and was infected with COVID.  She states she does have a cardiac history with a heart attack in the past.  She states she had angiography a couple years ago in [place] and it showed a lesion that was too close to the bifurcation for stenting.  She states that she is on anticoagulation but she does not take a daily aspirin.  She is unaware if she is on the anticoagulation for atrial fibrillation or for her heart disease.  She is also had a stroke in the past.  She had no acute neurologic symptoms.    PAST MEDICAL HISTORY   has a past medical history of Arrhythmia (01/01/2009), Brain aneurysm s/p coil (01/01/2010), CAD (coronary artery disease) (06/14/2012), Dyslipidemia (06/14/2012), Hemorrhagic disorder (HCC), History of CVA (cerebrovascular accident) (01/01/2010), Hypothyroid (06/14/2012), Indigestion, Right sided weakness (01/01/2010), and Stroke (HCC) (2010).    SURGICAL HISTORY   has a past surgical history that includes other; tonsillectomy; abdominal hysterectomy total; zzz ep/ablation; tendon lenghtening (Right, 8/3/2022); and tenotomy perc toe single tendon (Right, 9/26/2022).    FAMILY HISTORY  Family History   Problem Relation Age of Onset    Cancer Mother         lung    Heart Disease Mother     Heart Attack Mother     " "Thyroid Mother     Other Brother        SOCIAL HISTORY  Social History     Tobacco Use    Smoking status: Former     Current packs/day: 0.00     Average packs/day: 1 pack/day for 20.0 years (20.0 ttl pk-yrs)     Types: Cigarettes     Start date: 6/3/1954     Quit date: 6/3/1974     Years since quittin.4    Smokeless tobacco: Never    Tobacco comments:     quit 20 years ago smoked for 20 years less then pack u day   Vaping Use    Vaping Use: Never used   Substance and Sexual Activity    Alcohol use: Yes     Comment: drink every other day    Drug use: No    Sexual activity: Yes     Partners: Male     Comment:  /can do all ADLs by herself       CURRENT MEDICATIONS  Home Medications       Reviewed by Martina Bassett R.N. (Registered Nurse) on 23 at 0758  Med List Status: Not Addressed     Medication Last Dose Status   acetaminophen (TYLENOL) 500 MG Tab  Active   Cholecalciferol (VITAMIN D3) 125 MCG (5000 UT) Cap  Active   lisinopril (PRINIVIL) 5 MG TABS  Active   omeprazole (PRILOSEC) 20 MG Tablet Delayed Response delayed-release tablet  Active                    ALLERGIES  Allergies   Allergen Reactions    Epinephrine Palpitations    Darvocet [Propoxyphene N-Apap]      Makes crazy    Demerol      Makes crazy       PHYSICAL EXAM  VITAL SIGNS: BP (!) 147/82   Pulse 94   Temp 36.8 °C (98.3 °F) (Temporal)   Resp 14   Ht 1.575 m (5' 2\")   Wt 58.8 kg (129 lb 10.1 oz)   SpO2 92%   BMI 23.71 kg/m²    In general the patient seems anxious but no acute distress    HEENT unremarkable    Pulmonary the patient's lungs are clear to auscultation bilaterally    Cardiovascular S1-S2 with a regular rate and rhythm    GI abdomen soft with no distention    Skin no rashes, pallor, no jaundice    Extremities no edema    Neurologic examination is grossly intact    Results for orders placed or performed during the hospital encounter of 23   CBC with Differential   Result Value Ref Range    WBC 4.3 (L) 4.8 - " 10.8 K/uL    RBC 4.56 4.20 - 5.40 M/uL    Hemoglobin 13.1 12.0 - 16.0 g/dL    Hematocrit 40.2 37.0 - 47.0 %    MCV 88.2 81.4 - 97.8 fL    MCH 28.7 27.0 - 33.0 pg    MCHC 32.6 32.2 - 35.5 g/dL    RDW 44.7 35.9 - 50.0 fL    Platelet Count 276 164 - 446 K/uL    MPV 8.8 (L) 9.0 - 12.9 fL    Neutrophils-Polys 70.50 44.00 - 72.00 %    Lymphocytes 16.00 (L) 22.00 - 41.00 %    Monocytes 12.20 0.00 - 13.40 %    Eosinophils 0.90 0.00 - 6.90 %    Basophils 0.20 0.00 - 1.80 %    Immature Granulocytes 0.20 0.00 - 0.90 %    Nucleated RBC 0.00 0.00 - 0.20 /100 WBC    Neutrophils (Absolute) 2.99 1.82 - 7.42 K/uL    Lymphs (Absolute) 0.68 (L) 1.00 - 4.80 K/uL    Monos (Absolute) 0.52 0.00 - 0.85 K/uL    Eos (Absolute) 0.04 0.00 - 0.51 K/uL    Baso (Absolute) 0.01 0.00 - 0.12 K/uL    Immature Granulocytes (abs) 0.01 0.00 - 0.11 K/uL    NRBC (Absolute) 0.00 K/uL   Complete Metabolic Panel (CMP)   Result Value Ref Range    Sodium 137 135 - 145 mmol/L    Potassium 3.6 3.6 - 5.5 mmol/L    Chloride 102 96 - 112 mmol/L    Co2 20 20 - 33 mmol/L    Anion Gap 15.0 7.0 - 16.0    Glucose 102 (H) 65 - 99 mg/dL    Bun 10 8 - 22 mg/dL    Creatinine 0.56 0.50 - 1.40 mg/dL    Calcium 9.1 8.4 - 10.2 mg/dL    Correct Calcium 9.1 8.5 - 10.5 mg/dL    AST(SGOT) 19 12 - 45 U/L    ALT(SGPT) 24 2 - 50 U/L    Alkaline Phosphatase 117 (H) 30 - 99 U/L    Total Bilirubin 0.7 0.1 - 1.5 mg/dL    Albumin 4.0 3.2 - 4.9 g/dL    Total Protein 7.3 6.0 - 8.2 g/dL    Globulin 3.3 1.9 - 3.5 g/dL    A-G Ratio 1.2 g/dL   Troponins NOW   Result Value Ref Range    Troponin T 11 6 - 19 ng/L   Troponins in two (2) hours   Result Value Ref Range    Troponin T 10 6 - 19 ng/L   Lipase   Result Value Ref Range    Lipase 42 11 - 82 U/L   PT/INR   Result Value Ref Range    PT 20.0 (H) 12.0 - 14.6 sec    INR 1.62 (H) 0.87 - 1.13   APTT   Result Value Ref Range    APTT 43.9 (H) 24.7 - 36.0 sec   ESTIMATED GFR   Result Value Ref Range    GFR (CKD-EPI) 94 >60 mL/min/1.73 m 2   EKG    Result Value Ref Range    Report       [facility] Emergency Dept.    Test Date:  2023  Pt Name:    YASMEEN BASURTO                 Department: [facility]  MRN:        [id number]                      Room:  Gender:     Female                       Technician: [id number]  :        1945                   Requested By:ER TRIAGE PROTOCOL  Order #:    [id number]                    Reading MD: ETIENNE BOYER MD    Measurements  Intervals                                Axis  Rate:       92                           P:          87  OH:         154                          QRS:        52  QRSD:       80                           T:          10  QT:         360  QTc:        446    Interpretive Statements  EKG shows a normal sinus rhythm with a ventricular rate of 92, normal QRS,  slight ST segment depression throughout the precordial leads as well as in  lead aVF.  No reciprocal changes and no ST segment elevation.  Electronically Signed On 2023 10:24:53 PST by ETIENNE BOYER MD     EKG   Result Value Ref Range    Report       [facility] Emergency Dept.    Test Date:  2023  Pt Name:    YASMEEN BASURTO                 Department: [facility]  MRN:        [id number]                      Room:       [facility]  Gender:     Female                       Technician: [id number] FELICITA  :        1945                   Requested By:ER TRIAGE PROTOCOL  Order #:    [id number]                    Reading MD: ETIENNE BOYER MD    Measurements  Intervals                                Axis  Rate:       78                           P:          107  OH:         177                          QRS:        83  QRSD:       83                           T:          118  QT:         407  QTc:        464    Interpretive Statements  Twelve-lead EKG shows a normal sinus rhythm with a ventricular to 78, normal  QRS, normal intervals, continued slight depression diffusely through the  precordial  leads with no dynamic change from prior  Electronically Signed On 11- 12:42:46 PST by ETIENNE BOYER MD         EKG  I have independently interpreted this EKG  Please see my interpretation above      RADIOLOGY  DX-CHEST-PORTABLE (1 VIEW)   Final Result      Interstitial prominence.        HEART Score: 6    COURSE & MEDICAL DECISION MAKING    This a 78-year-old female who presents the emergency room with chest discomfort.  The patient does have significant risk factors including known history of cardiac disease.  The patient has a heart score of 6 and therefore she will require admission to be ruled out from a cardiac standpoint for her chest pain.  Gastritis and esophagitis would be in the differential due to her recent stress.  Otherwise the patient's abdomen is benign and therefore referred pain would be unlikely.  Chest x-ray does not show any evidence of a pulmonary source.  The patient is resting comfortably at the time of admission.  She did receive aspirin and she also takes anticoagulation.    The patient states she had complete resolution with a GI cocktail and she is adamant she would like to go home.  She is aware that she has significant risk from a cardiovascular standpoint.  She would like to follow-up with Dr. Avila her cardiologist for outpatient stress testing.  Therefore I will repeat an EKG and troponin to make sure there is no dynamic change.    Repeat EKG was performed as well as a troponin.  The patient is still adamant she would like to leave.  She is aware she has high risk with a heart score 6.  She did have resolution with a GI cocktail and esophagitis would be high on the differential.  The patient will be discharged home with instructions to follow-up with Dr. Avila for outpatient stress testing and she will return over the weekend if she is acutely worse.  I will have the patient increase her Prilosec to 40 mg twice a day.      FINAL DIAGNOSIS  1.  Chest  pain    Disposition  Patient will be discharged in stable condition       Electronically signed by: Jose Linder M.D., 11/17/2023 8:09 AM

## 2023-11-17 NOTE — ED NOTES
Med Rec completed per patient   Allergies reviewed  No ORAL antibiotics in last 30 days    Patient takes Xarelto 20 mg daily   Last dose 11/16/2023 PM     Patient's home pharmacy is [facility]

## 2023-11-17 NOTE — DISCHARGE INSTRUCTIONS
Increase your Prilosec to 2 tablets twice a day.  Continue your anticoagulation and follow-up with your cardiologist as discussed.

## 2023-11-17 NOTE — ED TRIAGE NOTES
"Chief Complaint   Patient presents with    Chest Pain     Woke from sleep this AM w/ \"crushing chest pain\"; pt has extensive cardiac hx     BP (!) 147/82   Pulse 94   Temp 36.8 °C (98.3 °F) (Temporal)   Resp 14   Ht 1.575 m (5' 2\")   Wt 58.8 kg (129 lb 10.1 oz)   SpO2 92%   BMI 23.71 kg/m²     EKG done in triage; pt brought back in w/c  "

## 2023-11-29 ENCOUNTER — PATIENT MESSAGE (OUTPATIENT)
Dept: HEALTH INFORMATION MANAGEMENT | Facility: OTHER | Age: 78
End: 2023-11-29

## 2024-01-12 ENCOUNTER — HOSPITAL ENCOUNTER (OUTPATIENT)
Facility: MEDICAL CENTER | Age: 79
End: 2024-01-12
Attending: FAMILY MEDICINE
Payer: MEDICARE

## 2024-01-12 ENCOUNTER — OFFICE VISIT (OUTPATIENT)
Dept: URGENT CARE | Facility: CLINIC | Age: 79
End: 2024-01-12
Payer: MEDICARE

## 2024-01-12 VITALS
DIASTOLIC BLOOD PRESSURE: 88 MMHG | HEIGHT: 62 IN | BODY MASS INDEX: 23.92 KG/M2 | TEMPERATURE: 98 F | RESPIRATION RATE: 16 BRPM | WEIGHT: 130 LBS | HEART RATE: 76 BPM | SYSTOLIC BLOOD PRESSURE: 124 MMHG | OXYGEN SATURATION: 96 %

## 2024-01-12 DIAGNOSIS — R30.0 DYSURIA: ICD-10-CM

## 2024-01-12 LAB
APPEARANCE UR: NORMAL
BILIRUB UR STRIP-MCNC: NEGATIVE MG/DL
COLOR UR AUTO: YELLOW
GLUCOSE UR STRIP.AUTO-MCNC: NEGATIVE MG/DL
KETONES UR STRIP.AUTO-MCNC: NEGATIVE MG/DL
LEUKOCYTE ESTERASE UR QL STRIP.AUTO: NORMAL
NITRITE UR QL STRIP.AUTO: NEGATIVE
PH UR STRIP.AUTO: 6 [PH] (ref 5–8)
PROT UR QL STRIP: NORMAL MG/DL
RBC UR QL AUTO: NORMAL
SP GR UR STRIP.AUTO: 1.01
UROBILINOGEN UR STRIP-MCNC: 1 MG/DL

## 2024-01-12 PROCEDURE — 81002 URINALYSIS NONAUTO W/O SCOPE: CPT | Performed by: FAMILY MEDICINE

## 2024-01-12 PROCEDURE — 87086 URINE CULTURE/COLONY COUNT: CPT

## 2024-01-12 PROCEDURE — 3079F DIAST BP 80-89 MM HG: CPT | Performed by: FAMILY MEDICINE

## 2024-01-12 PROCEDURE — 87077 CULTURE AEROBIC IDENTIFY: CPT

## 2024-01-12 PROCEDURE — 99203 OFFICE O/P NEW LOW 30 MIN: CPT | Performed by: FAMILY MEDICINE

## 2024-01-12 PROCEDURE — 3074F SYST BP LT 130 MM HG: CPT | Performed by: FAMILY MEDICINE

## 2024-01-12 PROCEDURE — 87186 SC STD MICRODIL/AGAR DIL: CPT

## 2024-01-12 RX ORDER — PHENAZOPYRIDINE HYDROCHLORIDE 200 MG/1
200 TABLET, FILM COATED ORAL EVERY 12 HOURS PRN
Qty: 4 TABLET | Refills: 0 | Status: SHIPPED | OUTPATIENT
Start: 2024-01-12

## 2024-01-12 RX ORDER — CEFDINIR 300 MG/1
300 CAPSULE ORAL 2 TIMES DAILY
Qty: 10 CAPSULE | Refills: 0 | Status: SHIPPED | OUTPATIENT
Start: 2024-01-12 | End: 2024-01-17

## 2024-01-12 RX ORDER — ALPRAZOLAM 0.25 MG/1
TABLET ORAL
COMMUNITY
End: 2024-01-29

## 2024-01-12 ASSESSMENT — FIBROSIS 4 INDEX: FIB4 SCORE: 1.08

## 2024-01-12 NOTE — PROGRESS NOTES
Subjective     Abbi King is a 78 y.o. female who presents with Dysuria (X2 days, burning with urination, and chills)    - This is a very pleasant 78 y.o. who has come to the walk-in clinic today for 1 day with urinary frequency and dysuria.  Feels like when he had a UTI 1 year ago.  No associated nausea vomiting fevers chills or flank pain.          ALLERGIES:  Darvocet [propoxyphene n-apap], Demerol, and Epinephrine     PMH:  Past Medical History:   Diagnosis Date    Arrhythmia 01/01/2009    hx of AFIB    Brain aneurysm s/p coil 01/01/2010    CAD (coronary artery disease) 06/14/2012    Dyslipidemia 06/14/2012    Hemorrhagic disorder (HCC)     History of CVA (cerebrovascular accident) 01/01/2010    pt has right side weakness/upper ext. flaccid    Hypothyroid 06/14/2012    Indigestion     GERD    Right sided weakness 01/01/2010    Stroke (HCC) 2010        PSH:  Past Surgical History:   Procedure Laterality Date    TN TENOTOMY PERC TOE SINGLE TENDON Right 9/26/2022    Procedure: RIGHT SECOND, THIRD, FOURTH GREAT TOE FLEXOR TENOTOMY;  Surgeon: Jaden Dean M.D.;  Location: [facility] [place];  Service: Orthopedics    TENDON LENGHTENING Right 8/3/2022    Procedure: RIGHT ACHILLES TENDON LENGTHENING;  Surgeon: Jaden Dean M.D.;  Location: [facility];  Service: Orthopedics    ABDOMINAL HYSTERECTOMY TOTAL      OTHER      coil aneurysm    TONSILLECTOMY      ZZZ EP/ABLATION      cardiac ablation x2       MEDS:    Current Outpatient Medications:     cefdinir (OMNICEF) 300 MG Cap, Take 1 Capsule by mouth 2 times a day for 5 days., Disp: 10 Capsule, Rfl: 0    phenazopyridine (PYRIDIUM) 200 MG Tab, Take 1 Tablet by mouth every 12 hours as needed for Mild Pain., Disp: 4 Tablet, Rfl: 0    lisinopril (PRINIVIL) 20 MG Tab, Take 20 mg by mouth every day., Disp: , Rfl:     rivaroxaban (XARELTO) 20 MG Tab tablet, Take 20 mg by mouth with dinner., Disp: , Rfl:     omeprazole (PRILOSEC) 20  "MG Tablet Delayed Response delayed-release tablet, Take 20 mg by mouth every evening., Disp: , Rfl:     Cholecalciferol (VITAMIN D3) 125 MCG (5000 UT) Cap, Take 5,000 Units by mouth every evening., Disp: , Rfl:     ALPRAZolam (XANAX) 0.25 MG Tab, Take 1 tablet 3 times a day by oral route. (Patient not taking: Reported on 1/12/2024), Disp: , Rfl:     ** I have documented what I find to be significant in regards to past medical, social, family and surgical history  in my HPI or under PMH/PSH/FH review section, otherwise it is noncontributory **           HPI    Review of Systems   Genitourinary:  Positive for dysuria.   All other systems reviewed and are negative.             Objective     /88   Pulse 76   Temp 36.7 °C (98 °F) (Temporal)   Resp 16   Ht 1.575 m (5' 2\")   Wt 59 kg (130 lb)   SpO2 96%   BMI 23.78 kg/m²      Physical Exam  Vitals and nursing note reviewed.   Constitutional:       General: She is not in acute distress.     Appearance: Normal appearance. She is well-developed.   HENT:      Head: Normocephalic.   Cardiovascular:      Heart sounds: Normal heart sounds. No murmur heard.  Pulmonary:      Effort: Pulmonary effort is normal. No respiratory distress.   Abdominal:      Palpations: Abdomen is soft.      Tenderness: There is no abdominal tenderness.   Neurological:      Mental Status: She is alert.      Motor: No abnormal muscle tone.   Psychiatric:         Mood and Affect: Mood normal.         Behavior: Behavior normal.                             Assessment & Plan     1. Dysuria  cefdinir (OMNICEF) 300 MG Cap    URINE CULTURE(NEW)    POCT Urinalysis    phenazopyridine (PYRIDIUM) 200 MG Tab          - Dx, plan & d/c instructions discussed   - Rest, stay hydrated        Follow up with your regular primary care providers office within a week to keep them updated and informed of this visit and for regular routine health maintenance check-ups. ER if not improving in 2-3 days or if " feeling/getting worse. (If you do not have a primary care provider and need to schedule one you may call [facility] at [phone] to do this).      Patient left in stable condition     POCT results reviewed/discussed    Pertinent prior lab work and/or imaging studies in Epic have been reviewed by me today on day of this visit and taken into account for my treatment and plan today    Pertinent PMH/PSH and/or chronic conditions and medications if any were reviewed today and taken into account for my treatment and plan today    Pertinent prior office visit notes in [facility] have been reviewed by me today on day of this visit.    Please note that this dictation may have been created using voice recognition software, if so I have made every reasonable attempt to correct obvious errors, but I expect that there are errors of grammar and possibly content that I did not discover before finalizing the note.

## 2024-01-14 LAB
BACTERIA UR CULT: ABNORMAL
BACTERIA UR CULT: ABNORMAL
SIGNIFICANT IND 70042: ABNORMAL
SITE SITE: ABNORMAL
SOURCE SOURCE: ABNORMAL

## 2024-01-29 ENCOUNTER — OFFICE VISIT (OUTPATIENT)
Dept: URGENT CARE | Facility: CLINIC | Age: 79
End: 2024-01-29
Payer: MEDICARE

## 2024-01-29 VITALS
DIASTOLIC BLOOD PRESSURE: 56 MMHG | OXYGEN SATURATION: 98 % | HEIGHT: 62 IN | HEART RATE: 74 BPM | RESPIRATION RATE: 16 BRPM | SYSTOLIC BLOOD PRESSURE: 110 MMHG | WEIGHT: 130 LBS | TEMPERATURE: 98.6 F | BODY MASS INDEX: 23.92 KG/M2

## 2024-01-29 DIAGNOSIS — R30.0 DYSURIA: ICD-10-CM

## 2024-01-29 DIAGNOSIS — N30.00 ACUTE CYSTITIS WITHOUT HEMATURIA: ICD-10-CM

## 2024-01-29 LAB
APPEARANCE UR: ABNORMAL
BILIRUB UR STRIP-MCNC: NEGATIVE MG/DL
COLOR UR AUTO: ABNORMAL
GLUCOSE UR STRIP.AUTO-MCNC: NEGATIVE MG/DL
KETONES UR STRIP.AUTO-MCNC: NEGATIVE MG/DL
LEUKOCYTE ESTERASE UR QL STRIP.AUTO: ABNORMAL
NITRITE UR QL STRIP.AUTO: POSITIVE
PH UR STRIP.AUTO: 6 [PH] (ref 5–8)
PROT UR QL STRIP: NEGATIVE MG/DL
RBC UR QL AUTO: NEGATIVE
SP GR UR STRIP.AUTO: 1.02
UROBILINOGEN UR STRIP-MCNC: 1 MG/DL

## 2024-01-29 PROCEDURE — 81002 URINALYSIS NONAUTO W/O SCOPE: CPT

## 2024-01-29 PROCEDURE — 3074F SYST BP LT 130 MM HG: CPT

## 2024-01-29 PROCEDURE — 99213 OFFICE O/P EST LOW 20 MIN: CPT

## 2024-01-29 PROCEDURE — 3078F DIAST BP <80 MM HG: CPT

## 2024-01-29 RX ORDER — SULFAMETHOXAZOLE AND TRIMETHOPRIM 800; 160 MG/1; MG/1
1 TABLET ORAL EVERY 12 HOURS
Qty: 14 TABLET | Refills: 0 | Status: SHIPPED | OUTPATIENT
Start: 2024-01-29 | End: 2024-02-05

## 2024-01-29 ASSESSMENT — FIBROSIS 4 INDEX: FIB4 SCORE: 1.08

## 2024-01-29 NOTE — PROGRESS NOTES
"Subjective:   Abbi King is a very pleasant 78 y.o. female who presents for:    Chief Complaint   Patient presents with    Urinary Frequency     \" I am still experiencing urinary issues since my last visit on 1/12/24.\"       HPI:    Urinary Frequency  Episode onset: the patient was diagnosed with a UTI on 1/24/24. She does not feel like her symptoms completely cleared after taking a course of cefdinir. Associated symptoms comments: Urinary frequency, dysuria. No fever, chills, myalgias, flank pain.       ROS:    ROS    Medications:      Current Outpatient Medications   Medication Sig    phenazopyridine (PYRIDIUM) 200 MG Tab Take 1 Tablet by mouth every 12 hours as needed for Mild Pain.    lisinopril (PRINIVIL) 20 MG Tab Take 20 mg by mouth every day.    rivaroxaban (XARELTO) 20 MG Tab tablet Take 20 mg by mouth with dinner.    omeprazole (PRILOSEC) 20 MG Tablet Delayed Response delayed-release tablet Take 20 mg by mouth every evening.    Cholecalciferol (VITAMIN D3) 125 MCG (5000 UT) Cap Take 5,000 Units by mouth every evening.       Allergies:     Allergies   Allergen Reactions    Darvocet [Propoxyphene N-Apap] Unspecified     Makes crazy    Demerol Unspecified     Makes crazy    Epinephrine Palpitations       Problem List:     Patient Active Problem List   Diagnosis    History of CVA (cerebrovascular accident)    Right sided weakness    Brain aneurysm s/p coil    Hypothyroid    Dyslipidemia    CAD (coronary artery disease)    Hiatal hernia    Chest tightness or pressure    Gastroesophageal reflux disease    External hemorrhoid    Personal history of tobacco use    Equinus contracture of right ankle    Right foot pain    DNR (do not resuscitate)       Surgical History:    Past Surgical History:   Procedure Laterality Date    ME TENOTOMY PERC TOE SINGLE TENDON Right 9/26/2022    Procedure: RIGHT SECOND, THIRD, FOURTH GREAT TOE FLEXOR TENOTOMY;  Surgeon: Jaden Dean M.D.;  Location: [facility];  Service: Orthopedics    TENDON LENGHTENING Right 8/3/2022    Procedure: RIGHT ACHILLES TENDON LENGTHENING;  Surgeon: Jaden Dean M.D.;  Location: SURGERY [facility];  Service: Orthopedics    ABDOMINAL HYSTERECTOMY TOTAL      OTHER      coil aneurysm    TONSILLECTOMY      ZZZ EP/ABLATION      cardiac ablation x2       Past Social Hx:     Social History     Socioeconomic History    Marital status:    Tobacco Use    Smoking status: Former     Current packs/day: 0.00     Average packs/day: 1 pack/day for 20.0 years (20.0 ttl pk-yrs)     Types: Cigarettes     Start date: 6/3/1954     Quit date: 6/3/1974     Years since quittin.6    Smokeless tobacco: Never    Tobacco comments:     quit 20 years ago smoked for 20 years less then pack u day   Vaping Use    Vaping Use: Never used   Substance and Sexual Activity    Alcohol use: Yes     Comment: drink every other day    Drug use: No    Sexual activity: Yes     Partners: Male     Comment:  /can do all ADLs by herself        Past Family Hx:      Family History   Problem Relation Age of Onset    Cancer Mother         lung    Heart Disease Mother     Heart Attack Mother     Thyroid Mother     Other Brother        Problem list, medications, and allergies reviewed by myself today in Epic.     Objective:     Vitals:    24 1122   BP: 110/56   Pulse: 74   Resp: 16   Temp: 37 °C (98.6 °F)   SpO2: 98%       Physical Exam  Vitals reviewed.   Constitutional:       General: She is not in acute distress.     Appearance: Normal appearance. She is normal weight. She is not ill-appearing, toxic-appearing or diaphoretic.   HENT:      Head: Normocephalic and atraumatic.      Nose: Nose normal.      Mouth/Throat:      Mouth: Mucous membranes are moist.   Eyes:      Extraocular Movements: Extraocular movements intact.      Conjunctiva/sclera: Conjunctivae normal.      Pupils: Pupils are equal, round, and reactive to light.   Cardiovascular:       Rate and Rhythm: Normal rate and regular rhythm.      Pulses: Normal pulses.      Heart sounds: Normal heart sounds.   Pulmonary:      Effort: Pulmonary effort is normal.   Abdominal:      General: Abdomen is flat.      Palpations: Abdomen is soft.      Tenderness: There is no abdominal tenderness. There is no right CVA tenderness, left CVA tenderness or guarding.   Musculoskeletal:         General: Normal range of motion.      Cervical back: Normal range of motion.   Skin:     General: Skin is warm and dry.   Neurological:      General: No focal deficit present.      Mental Status: She is alert and oriented to person, place, and time. Mental status is at baseline.   Psychiatric:         Mood and Affect: Mood normal.         Behavior: Behavior normal.         Thought Content: Thought content normal.             Results from POCT:     Results for orders placed or performed during the hospital encounter of 01/12/24   URINE CULTURE(NEW)    Specimen: Urine   Result Value Ref Range    Significant Indicator POS (POS)     Source UR     Site -     Culture Result - (A)     Culture Result Escherichia coli  >100,000 cfu/mL   (A)        Susceptibility    Escherichia coli - CB     Ampicillin <=8 Sensitive mcg/mL     Amoxicillin/CA <=8/4 Sensitive mcg/mL     Ceftriaxone <=1 Sensitive mcg/mL     Cefazolin* <=2 Sensitive mcg/mL      * Breakpoints when Cefazolin is used for therapy of infections  other than uncomplicated UTIs due to Enterobacterales are as  follows:  CB and Interpretation:  <=2 S  4 I  >=8 R       Ciprofloxacin <=0.25 Sensitive mcg/mL     Cefepime <=2 Sensitive mcg/mL     Cefuroxime <=4 Sensitive mcg/mL     Ampicillin/sulbactam <=4/2 Sensitive mcg/mL     Tobramycin <=2 Sensitive mcg/mL     Nitrofurantoin <=32 Sensitive mcg/mL     Gentamicin <=2 Sensitive mcg/mL     Levofloxacin <=0.5 Sensitive mcg/mL     Minocycline <=4 Sensitive mcg/mL     Pip/Tazobactam <=8 Sensitive mcg/mL     Trimeth/Sulfa <=0.5/9.5 Sensitive  mcg/mL     Tigecycline <=2 Sensitive mcg/mL      Results for orders placed or performed in visit on 01/29/24   POCT Urinalysis   Result Value Ref Range    POC Color Dark yellow Negative    POC Appearance Cloudy Negative    POC Glucose Negative Negative mg/dL    POC Bilirubin Negative Negative mg/dL    POC Ketones Negative Negative mg/dL    POC Specific Gravity 1.020 <1.005 - >1.030    POC Blood Negative Negative    POC Urine PH 6.0 5.0 - 8.0    POC Protein Negative Negative mg/dL    POC Urobiligen 1.0 Negative (0.2) mg/dL    POC Nitrites Positive Negative    POC Leukocyte Esterase Moderate Negative         Assessment/Plan:     Diagnosis and associated orders:     1. Dysuria  - URINE CULTURE(NEW); Future  - POCT Urinalysis         Comments/MDM:     Patient was nontoxic, stable. Ambulatory. Exam as above.  UA consistent with UTI  C and S from urine culture obtained on 1/12/2024 used.  Prescription for Bactrim sent to patient's preferred pharmacy for the treatment of acute cystitis.  An extended prescription was given at the patient reports that her symptoms are typically nonresponsive to the usual course of antibiotics.   Supportive care reviewed with patient.    Recommend to push PO fluids and electrolytes, complete course of antibiotics, NSAIDs/Tylenol, Azo as needed for dysuria, d-mannose/raw, sugar-free cranberry juice for prevention of future UTIs, observe for resolution  Reviewed external records.   Differential diagnosis considered. Overall presentation is consistent with UTI. Low suspicion for appendicitis, gonadal torsion, STI, pyelonephritis  Discharged with instructions to obtain outpatient follow up of patient’s symptoms and findings, with strict return precautions if patient develops new or worsening symptoms.   Follow-up with PCP or UC with lack of resolution or progression of symptoms.            All questions answered. Patient verbalized understanding and is in agreement with this plan of care.     If  symptoms are worsening or not improving in 3-5 days, follow-up with PCP or return to UC. Differential diagnosis, natural history, and supportive care discussed. AVS handout given and reviewed with patient. Patient educated on red flags and when to seek treatment back in ED or UC.     I personally reviewed prior external notes and test results pertinent to today's visit.  I have independently reviewed and interpreted all diagnostics ordered during this urgent care visit.     This dictation has been created using voice recognition software. The accuracy of the dictation is limited by the abilities of the software. I expect there may be some errors of grammar and possibly content. I made every attempt to manually correct the errors within my dictation. However, errors related to voice recognition software may still exist and should be interpreted within the appropriate context.    This note was electronically signed by JAMI Payton

## 2024-10-31 ENCOUNTER — APPOINTMENT (OUTPATIENT)
Dept: PHYSICAL MEDICINE AND REHAB | Facility: MEDICAL CENTER | Age: 79
End: 2024-10-31
Payer: MEDICARE

## 2024-11-12 ENCOUNTER — APPOINTMENT (OUTPATIENT)
Dept: PHYSICAL MEDICINE AND REHAB | Facility: MEDICAL CENTER | Age: 79
End: 2024-11-12
Payer: MEDICARE

## 2024-11-12 VITALS
SYSTOLIC BLOOD PRESSURE: 148 MMHG | BODY MASS INDEX: 24.26 KG/M2 | WEIGHT: 131.84 LBS | TEMPERATURE: 98.3 F | DIASTOLIC BLOOD PRESSURE: 80 MMHG | OXYGEN SATURATION: 96 % | HEART RATE: 69 BPM | HEIGHT: 62 IN

## 2024-11-12 DIAGNOSIS — G81.11 SPASTIC HEMIPLEGIA OF RIGHT DOMINANT SIDE DUE TO CEREBROVASCULAR DISEASE (HCC): ICD-10-CM

## 2024-11-12 DIAGNOSIS — M21.331 DROP HAND, RIGHT: ICD-10-CM

## 2024-11-12 DIAGNOSIS — Z91.81 RISK FOR FALLS: ICD-10-CM

## 2024-11-12 DIAGNOSIS — R53.1 RIGHT SIDED WEAKNESS: ICD-10-CM

## 2024-11-12 DIAGNOSIS — R26.9 NEUROLOGIC GAIT DISORDER: ICD-10-CM

## 2024-11-12 DIAGNOSIS — Z71.82 EXERCISE COUNSELING: ICD-10-CM

## 2024-11-12 DIAGNOSIS — R25.2 SPASTICITY: ICD-10-CM

## 2024-11-12 DIAGNOSIS — Z71.3 DIETARY COUNSELING: ICD-10-CM

## 2024-11-12 DIAGNOSIS — M21.371 ACQUIRED RIGHT FOOT DROP: ICD-10-CM

## 2024-11-12 DIAGNOSIS — I67.9 SPASTIC HEMIPLEGIA OF RIGHT DOMINANT SIDE DUE TO CEREBROVASCULAR DISEASE (HCC): ICD-10-CM

## 2024-11-12 PROCEDURE — 3079F DIAST BP 80-89 MM HG: CPT | Performed by: GENERAL PRACTICE

## 2024-11-12 PROCEDURE — 3077F SYST BP >= 140 MM HG: CPT | Performed by: GENERAL PRACTICE

## 2024-11-12 PROCEDURE — G2212 PROLONG OUTPT/OFFICE VIS: HCPCS | Performed by: GENERAL PRACTICE

## 2024-11-12 PROCEDURE — 99205 OFFICE O/P NEW HI 60 MIN: CPT | Performed by: GENERAL PRACTICE

## 2024-11-12 PROCEDURE — 1125F AMNT PAIN NOTED PAIN PRSNT: CPT | Performed by: GENERAL PRACTICE

## 2024-11-12 ASSESSMENT — FIBROSIS 4 INDEX: FIB4 SCORE: 1.1

## 2024-11-12 ASSESSMENT — PAIN SCALES - GENERAL: PAINLEVEL_OUTOF10: 1=MINIMAL PAIN

## 2024-11-12 ASSESSMENT — PATIENT HEALTH QUESTIONNAIRE - PHQ9: CLINICAL INTERPRETATION OF PHQ2 SCORE: 0

## 2024-11-12 NOTE — PROGRESS NOTES
[facility]  [facility]   [address], [address] [place] [address]  Ph: [phone]      Patient Name: Abbi King   Patient : 1945  PCP: Pcp Pt States None    Referring Physician: Yahir Cabrera,*   Reason for Referral: Spasticity Management   Examining Physician: Daniel Uriarte DO  Date of Service: see epic      SUBJECTIVE:   Patient Identification: Abbi King is a 79 y.o. RIGHT hand dominant female with rehabilitation history significant for cerebrovascular accident (),  status post coil, right-sided weakness and is presenting to [facility] for a NEW evaluation with the following chief complaint/s:    Chief Complaint: DME    Accompanied by Today: none    Reviewed prior notes  Right Second, Third, Fourth Great Toe Flexor Tenotomy - Right on 2022   -posterior medial release, Achilles lengthening and then subsequently lesser toe corrections     History of Present Illness:     2024 needs therapy as affected ambulation.  Difficult with KAFO.   Wobbling.  Using single-point cane for ambulation.  No recent physical therapy or occupational therapy.  1 out of 10 pain.  No loss of sensation of bilateral lower extremities or hands.  Sensitivity to hot water with right upper extremity.      Current Spasticity Medications and Dosages:  none    Other current meds of note  gabapentin    Past Spasticity Medications and Dosages:  Baclofen trial     Previous Spasticity Injection History:  9/10/24 Dr Nolan: R pec 100, R brachiallis 100, R biceps 50 (2 spots), FCR 50, , , APB/L 50    2012  50 cc right pectoralis major.   40 cc right brachialis; 40 cc right biceps muscles.  25 cc right flexor carpi radialis and 25 cc right flexor carpi ulnaris  muscles.  20 cc right flexor digitorum profundus and 20 cc right flexor  digitorum sublimis.  45 cc medial head right gastrocnemius; 45 cc lateral head right  gastrocnemius muscle  group.      Review of Systems:  Red Flags ROS:   Fever, Chills, Sweats: Denies  Involuntary Weight Loss: Denies  See HPI    Past Medical History:  Past Medical History:   Diagnosis Date    Hypothyroid 2012    Dyslipidemia 2012    CAD (coronary artery disease) 2012    History of CVA (cerebrovascular accident) 2010    pt has right side weakness/upper ext. flaccid    Right sided weakness 2010    Brain aneurysm s/p coil 2010    Stroke (HCC)     Arrhythmia 2009    hx of AFIB    Hemorrhagic disorder (HCC)     Indigestion     GERD      Allergies   Allergen Reactions    Darvocet [Propoxyphene N-Apap] Unspecified     Makes crazy    Demerol Unspecified     Makes crazy    Epinephrine Palpitations        Past Social History:  Social History     Socioeconomic History    Marital status:      Spouse name: Not on file    Number of children: Not on file    Years of education: Not on file    Highest education level: Not on file   Occupational History    Not on file   Tobacco Use    Smoking status: Former     Current packs/day: 0.00     Average packs/day: 1 pack/day for 20.0 years (20.0 ttl pk-yrs)     Types: Cigarettes     Start date: 6/3/1954     Quit date: 6/3/1974     Years since quittin.4    Smokeless tobacco: Never    Tobacco comments:     quit 20 years ago smoked for 20 years less then pack u day   Vaping Use    Vaping status: Never Used   Substance and Sexual Activity    Alcohol use: Yes     Comment: drink every other day    Drug use: No    Sexual activity: Yes     Partners: Male     Comment:  /can do all ADLs by herself   Other Topics Concern    Not on file   Social History Narrative    Not on file     Social Drivers of Health     Financial Resource Strain: Not on file   Food Insecurity: Not on file   Transportation Needs: Not on file   Physical Activity: Not on file   Stress: Not on file   Social Connections: Not on file   Intimate Partner Violence: Not on file  "  Housing Stability: Not on file        Family History:  Family History   Problem Relation Age of Onset    Cancer Mother         lung    Heart Disease Mother     Heart Attack Mother     Thyroid Mother     Other Brother          OBJECTIVE:   Vital Signs:  BP (!) 148/80 (BP Location: Right arm, Patient Position: Sitting, BP Cuff Size: Adult)   Pulse 69   Temp 36.8 °C (98.3 °F) (Temporal)   Ht 1.575 m (5' 2\")   Wt 59.8 kg (131 lb 13.4 oz)   SpO2 96%   BMI 24.11 kg/m²     Physical Exam:   Body Habitus: Body mass index is 24.11 kg/m².  Appearance: Well-groomed, well-nourished, not disheveled  Eyes: No scleral icterus to suggest severe liver disease, no proptosis to suggest severe hyperthyroid  ENT -no obvious auditory deficits, no external lesions, moist mucus membranes   Skin -no rashes or lesions noted. No appreciable skin breakdown on exposed skin areas.    Respiratory-  breathing comfortably on room air, no audible wheezing, full sentences  Cardiovascular- No lower extremity edema noted.   Psychiatric- alert and oriented,  calm, comfortable, cooperative   Gait - Ambulatory with single cane and R KAFO.  Neuromuscular- Awake alert.  Conversational.  Logical thought content. No truncal weakness. R Foot Drop.  R Wrist drop  R facial hemiparesis  DTRs hyperreflexia RUE and RLE and gomez; no sustained clonus.  Same sensation bilaterally  Circumduction gait, valgus knee, invert foot    Tone on Modified Sarah Scale    R L  R L   Elbow extension (testing tone of elbow flexors) 0  0 Hip extension (testing hip flexors) 0 0   Elbow flexion (testing tone of elbow extensors) 1+ to 2 0 Hip abduction (testing adductors) 0 0   Wrist extension (testing tone of wrist flexors)  1 0 Knee extension (testing knee flexors) 0 0   Finger extension (testing tone of finger flexors) 1 0 Knee flexion (testing knee extensors) 0 0   Supination (testing forearm pronators) 0 0 Dorsiflexion (testing plantarflexors) 0 0   Shoulder Abduction " (testing pectoralis, teres) 0 0 Plantarflexion (testing dorsiflexors) 0 0     Motor Exam Upper Extremities   ? Myotome R L   Shoulder flexion C5 1 5   Elbow flexion C5 1 5   Wrist extension C6 1 5   Elbow extension C7 1 5   Finger flexion C8 3 5   Finger abduction T1 3 5      Motor Exam Lower Extremities  ? Myotome R L   Hip flexion L2 4 5   Knee extension L3 4 5   Ankle dorsiflexion L4 2 5   Toe extension L5 2 5   Ankle plantarflexion S1 4 5     Pertinent Labs:  Lab Results   Component Value Date/Time    SODIUM 137 11/17/2023 08:05 AM    POTASSIUM 3.6 11/17/2023 08:05 AM    CHLORIDE 102 11/17/2023 08:05 AM    CO2 20 11/17/2023 08:05 AM    GLUCOSE 102 (H) 11/17/2023 08:05 AM    BUN 10 11/17/2023 08:05 AM    CREATININE 0.56 11/17/2023 08:05 AM       Lab Results   Component Value Date/Time    WBC 4.3 (L) 11/17/2023 08:05 AM    RBC 4.56 11/17/2023 08:05 AM    HEMOGLOBIN 13.1 11/17/2023 08:05 AM    HEMATOCRIT 40.2 11/17/2023 08:05 AM    MCV 88.2 11/17/2023 08:05 AM    MCH 28.7 11/17/2023 08:05 AM    MCHC 32.6 11/17/2023 08:05 AM    MPV 8.8 (L) 11/17/2023 08:05 AM    NEUTSPOLYS 70.50 11/17/2023 08:05 AM    LYMPHOCYTES 16.00 (L) 11/17/2023 08:05 AM    MONOCYTES 12.20 11/17/2023 08:05 AM    EOSINOPHILS 0.90 11/17/2023 08:05 AM    BASOPHILS 0.20 11/17/2023 08:05 AM    HYPOCHROMIA 1+ 05/03/2012 01:57 PM       Lab Results   Component Value Date/Time    ASTSGOT 19 11/17/2023 08:05 AM    ALTSGPT 24 11/17/2023 08:05 AM                      Results for orders placed in visit on 02/20/24    DX-FOOT-COMPLETE 3+ RIGHT                                      ASSESSMENT/PLAN: Abbi King  is a 79 y.o. female with rehabilitation history significant for cerebrovascular accident (2010/2012) status post coil, right-sided weakness presenting for DME management. The following plan was discussed with the patient who is in agreement.     1. Spasticity  Referral to Occupational Therapy    Referral to Physical Therapy    CANCELED: Referral to  Physical Therapy    CANCELED: Referral to Occupational Therapy      2. Spastic hemiplegia of right dominant side due to cerebrovascular disease (HCC)  Referral to Occupational Therapy    Referral to Physical Therapy    CANCELED: Referral to Physical Therapy    CANCELED: Referral to Occupational Therapy      3. Exercise counseling        4. Dietary counseling        5. Risk for falls        6. Right sided weakness        7. Neurologic gait disorder  Referral to Occupational Therapy    Referral to Physical Therapy    CANCELED: Referral to Physical Therapy    CANCELED: Referral to Occupational Therapy      8. Acquired right foot drop        9. Drop hand, right            Medication Management: none  Ambulatory single point cane.  Orthotics  R KAFO. Potential new assistive devices may be required at this time. Please continue with current devices  Patient not participating in physical therapy and Occupational Therapy.  Will placed referrals and contacted to put patient on waitlist in [facility] per pt preference.        Other:  Grieving:   recently; PHQ9 neg  Botox: Dr Nolan  exercise counseling and fall prevention discussed    Orders Placed This Encounter    Referral to Occupational Therapy    Referral to Physical Therapy     Follow-up: 3 months   Patient expressed understanding of the management plan. Patient was encouraged to call if any worries, issues, problems or concerns prior to the next visit     Please note that this dictation was created using voice recognition software. I have made every reasonable attempt to correct obvious errors but there may be errors of grammar and content that I may have overlooked prior to finalization of this note.      Daniel Uriarte DO  Department of Physical Medicine and Rehabilitation  [facility]         CC Pcp Pt States None   Yahir Baker,*

## 2025-01-02 ENCOUNTER — TELEPHONE (OUTPATIENT)
Dept: PHYSICAL MEDICINE AND REHAB | Facility: MEDICAL CENTER | Age: 80
End: 2025-01-02
Payer: MEDICARE

## 2025-01-02 NOTE — TELEPHONE ENCOUNTER
Caller Name: pt Abbi  Call Back Number:     How would the patient prefer to be contacted with a response: Phone call OK to leave a detailed message    Abbi called stating that she had not yet been contacted by [facility] PT and said she would not like to go there anymore. She would like to do her PT within [facility] if possible and sounded like she wanted to get started as soon as she can. Please resend referral to [facility] if possible.

## 2025-01-07 DIAGNOSIS — M21.331 DROP HAND, RIGHT: ICD-10-CM

## 2025-01-07 DIAGNOSIS — M21.371 ACQUIRED RIGHT FOOT DROP: ICD-10-CM

## 2025-01-07 DIAGNOSIS — R26.9 NEUROLOGIC GAIT DISORDER: ICD-10-CM

## 2025-01-07 DIAGNOSIS — I67.9 SPASTIC HEMIPLEGIA OF RIGHT DOMINANT SIDE DUE TO CEREBROVASCULAR DISEASE (HCC): ICD-10-CM

## 2025-01-07 DIAGNOSIS — R25.2 SPASTICITY: ICD-10-CM

## 2025-01-07 DIAGNOSIS — G81.11 SPASTIC HEMIPLEGIA OF RIGHT DOMINANT SIDE DUE TO CEREBROVASCULAR DISEASE (HCC): ICD-10-CM

## 2025-01-07 NOTE — TELEPHONE ENCOUNTER
"Phone Number Called: [phone]    Call outcome: Spoke to patient regarding message below.    Message: Informed pt that \"[facility] only\" new referral was sent for PT   "

## 2025-01-30 ENCOUNTER — PHYSICAL THERAPY (OUTPATIENT)
Dept: PHYSICAL THERAPY | Facility: MEDICAL CENTER | Age: 80
End: 2025-01-30
Attending: GENERAL PRACTICE
Payer: MEDICARE

## 2025-01-30 DIAGNOSIS — G81.11 SPASTIC HEMIPLEGIA OF RIGHT DOMINANT SIDE DUE TO CEREBROVASCULAR DISEASE (HCC): ICD-10-CM

## 2025-01-30 DIAGNOSIS — M21.331 DROP HAND, RIGHT: ICD-10-CM

## 2025-01-30 DIAGNOSIS — M21.371 ACQUIRED RIGHT FOOT DROP: ICD-10-CM

## 2025-01-30 DIAGNOSIS — R26.9 NEUROLOGIC GAIT DISORDER: ICD-10-CM

## 2025-01-30 DIAGNOSIS — I67.9 SPASTIC HEMIPLEGIA OF RIGHT DOMINANT SIDE DUE TO CEREBROVASCULAR DISEASE (HCC): ICD-10-CM

## 2025-01-30 DIAGNOSIS — R25.2 SPASTICITY: ICD-10-CM

## 2025-01-30 PROCEDURE — 97116 GAIT TRAINING THERAPY: CPT

## 2025-01-30 PROCEDURE — 97163 PT EVAL HIGH COMPLEX 45 MIN: CPT

## 2025-01-30 SDOH — ECONOMIC STABILITY: GENERAL: QUALITY OF LIFE: FAIR

## 2025-01-30 ASSESSMENT — BALANCE ASSESSMENTS
STANDING UNSUPPORTED WITH FEET TOGETHER: 3
STANDING ON ONE LEG: 0
LONG VERSION TOTAL SCORE (MAX 56): 36
TURN 360 DEGREES: 2
STANDING UNSUPPORTED: 4
LONG VERSION TOTAL SCORE (MAX 56): 36
STANDING TO SITTING: 3
STANDING UNSUPPORTED ONE FOOT IN FRONT: 0
STANDING UNSUPPORTED WITH EYES CLOSED: 4
MEDICARE IMPAIRMENT PERCENTAGE: 36
LOOK OVER LEFT AND RIGHT SHOULDERS WHILE STANDING: 2
PICK UP OBJECT FROM THE FLOOR FROM A STANDING POSITION: 4
SITTING UNSUPPORTED: 4
TRANSFERS: 3
PLACE ALTERNATE FOOT ON STEP OR STOOL WHILE STANDING UNSUPPORTED: 0
SITTING TO STANDING: 3
REACHING FORWARD WITH OUTSTRETCHED ARM WHILE STANDING: 4

## 2025-01-30 ASSESSMENT — ACTIVITIES OF DAILY LIVING (ADL): POOR_BALANCE: 1

## 2025-01-30 NOTE — OP THERAPY EVALUATION
Outpatient Physical Therapy  INITIAL NEUROLOGICAL EVALUATION    [facility] Outpatient Physical Therapy  [address]  [place] [address]  Phone:  [phone]  Fax:  [phone]    Date of Evaluation: 01/30/2025    Patient: Abbi King  YOB: 1945  MRN: [id number]     Referring Provider: Daniel Uriarte D.O.  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 Spasticity  R25.2   2. Spastic hemiplegia of right dominant side due to cerebrovascular disease (HCC)  I67.9    G81.11   3. Neurologic gait disorder  R26.9   4. Acquired right foot drop  M21.371   5. Drop hand, right  M21.331       Subjective:   History of Present Illness:     Mechanism of injury:  Patient is a 79 year old female who has been referred to physical therapy following L CVA x 2 (2010/2012) and two brain aneurysms with status post coil. She is right handed.     Patient presents to initial evaluation with SPC and R KAFO . States that she obtained a KAFO about a year ago (used an AFO prior). States that she was going into a restaurant when her prosthetist saw her walking and recommended a KAFO. She feels like this device has been resulted in significant decline in walking, increase in spasticity, and decline in strength. She has also had multiple adjustments to the device as it is still not fitting appropriately (knee joint noted to not be in alignment during evaluation). Ambulates in house without AD and without KAFO. Is not sure if she still has her old AFO.     Patient reports that KAFO has  also impacted balance. Reports that she falls infrequently but did fall the other day when negotiating stairs in front of her house and then in the house; unable to get up without assistance when on the stairs. Does state that she is able to get up when she is on a level floor with stable surfaces available to pull up on.  Following falls, did go to the ER and noted bruised ribs but no broken bones.     Feels like her right arm is  and would like guidance on which exercises she can perform. Currently able to perform shoulder elevation. Botox is helping with spasticity and wears brace at night for spasticity and carpal tunnel.     PMHx: Right Second, Third, Fourth Great Toe Flexor Tenotomy on 9/26/2022, Botox by Dr. Nolan - next appointment 3/5 (only injecting into arm). Heart attack.     Of note, patient just lost her  in November 2024.     Quality of life:  Fair  Social Support:     Lives in:  One-story house    Lives with:  Alone  Activities of Daily Living:     Patient reported ADL status: Independent with all ADLs and IADLs - has housekeeping and grocery delivery.     Has LE ergometer and has been performing daily recently.   Patient Goals:     Other patient goals:  Improve walking, address R arm.      Past Medical History:   Diagnosis Date    Arrhythmia 01/01/2009    hx of AFIB    Brain aneurysm s/p coil 01/01/2010    CAD (coronary artery disease) 06/14/2012    Dyslipidemia 06/14/2012    Hemorrhagic disorder (HCC)     History of CVA (cerebrovascular accident) 01/01/2010    pt has right side weakness/upper ext. flaccid    Hypothyroid 06/14/2012    Indigestion     GERD    Right sided weakness 01/01/2010    Stroke (HCC) 2010     Past Surgical History:   Procedure Laterality Date    OH TENOTOMY PERC TOE SINGLE TENDON Right 9/26/2022    Procedure: RIGHT SECOND, THIRD, FOURTH GREAT TOE FLEXOR TENOTOMY;  Surgeon: Jaden Dean M.D.;  Location: [facility];  Service:  Orthopedics    TENDON LENGHTENING Right 8/3/2022    Procedure: RIGHT ACHILLES TENDON LENGTHENING;  Surgeon: Jaden Dean M.D.;  Location: [facility];  Service: Orthopedics    ABDOMINAL HYSTERECTOMY TOTAL      OTHER      coil aneurysm    TONSILLECTOMY      ZZZ EP/ABLATION      cardiac ablation x2     Social History     Tobacco Use    Smoking status: Former     Current packs/day: 0.00     Average packs/day: 1 pack/day for 20.0 years (20.0 ttl pk-yrs)     Types: Cigarettes     Start date: 6/3/1954     Quit date: 6/3/1974     Years since quittin.6    Smokeless tobacco: Never    Tobacco comments:     quit 20 years ago smoked for 20 years less then pack u day   Substance Use Topics    Alcohol use: Yes     Comment: drink every other day     Family and Occupational History     Socioeconomic History    Marital status:      Spouse name: Not on file    Number of children: Not on file    Years of education: Not on file    Highest education level: Not on file   Occupational History    Not on file       Objective:     Strength:   Lower extremity (right):     Knee flexion: 2-    Knee extension: 3+    Strength Comments:  5XSTS: 12.59 seconds with LUE assist and increased weight on LLE.     With use of synergy pattern able to perform R ankle dorsiflexion     Tone, Sensation and Coordination:   Tone:     Left lower extremity muscle tone: Normal    Right lower extremity muscle tone: Spastic    Observational Gait     Additional Observational Gait Details  Note RLE abduction during gait, severe genu recurvatum and moderate genu valgus, decreased L step length and poor midstance posture on RLE. Forward flexed with gaze at feet.     Significant path deviation with concurrent head turns.     Balance/Gait Comments   10MWT: 11.48 with SADIE DAWSON   Gait speed: 0.87            Therapeutic Exercises (CPT 13939):     1. Education in exam findings and PT POC    Therapeutic Treatments and Modalities:     1. Gait  Training (CPT 63001), Education on proper midstance posture. Patient able to assume without genu recurvatum but difficulty with maintaining while taking forward step with LLE., Added static standing practice to HEP with plans to work into gait.    Time-based treatments/modalities:    Physical Therapy Timed Treatment Charges  Gait training minutes (CPT 92580): 10 minutes      Assessment, Response and Plan:   Impairments: abnormal gait, abnormal muscle tone, activity intolerance, impaired balance, impaired physical strength and lacks appropriate home exercise program    Assessment details:  Patient is a pleasant 79 year old female who was referred for R hemiparesis following two L CVAs. She presents with the following impairments: impaired functional strength, impaired static and dynamic balance, and impaired gait. Based on these evaluation findings, patient would benefit from skilled physical therapy to address these impairments to allow for improved walking mechanics and decreased risk for falling.     Other barriers to therapy:  Time since onset  Prognosis: fair    Goals:   Short Term Goals:   1. Instruct in HEP   Short term goal time span:  2-4 weeks      Long Term Goals:    1. Independent with HEP.   2. Perform STS transfer with equal weight bearing and no UE assist to indicate improved RLE strength.   3. Improve score on BBS to 41/56 or better to indicate a low risk for falling.   4. Complete 10MWT with 0.95 m/sec gait speed or better with improved R knee control, LRAD, and appropriate orthotic.   Long term goal time span:  2-4 months    Plan:   Therapy options:  Physical therapy treatment to continue  Planned therapy interventions:  Gait Training (CPT 29718), Neuromuscular Re-education (CPT 02165), Therapeutic Activities (CPT 34698) and Therapeutic Exercise (CPT 42719)  Frequency: 1-2x/week.  Duration in weeks:  12  Discussed with:  Patient      Functional Assessment Used  Bai Balance Total Score (0-56): 36        Referring provider co-signature:  I have reviewed this plan of care and my co-signature certifies the need for services.    Certification Period: 01/30/2025 to  04/17/25    Physician Signature: ________________________________ Date: ______________

## 2025-02-04 ENCOUNTER — APPOINTMENT (OUTPATIENT)
Dept: PHYSICAL THERAPY | Facility: MEDICAL CENTER | Age: 80
End: 2025-02-04
Attending: GENERAL PRACTICE
Payer: MEDICARE

## 2025-02-05 ENCOUNTER — APPOINTMENT (OUTPATIENT)
Dept: PHYSICAL THERAPY | Facility: MEDICAL CENTER | Age: 80
End: 2025-02-05
Payer: MEDICARE

## 2025-02-06 ENCOUNTER — APPOINTMENT (OUTPATIENT)
Dept: PHYSICAL THERAPY | Facility: MEDICAL CENTER | Age: 80
End: 2025-02-06
Attending: GENERAL PRACTICE
Payer: MEDICARE

## 2025-02-12 ENCOUNTER — APPOINTMENT (OUTPATIENT)
Dept: PHYSICAL MEDICINE AND REHAB | Facility: MEDICAL CENTER | Age: 80
End: 2025-02-12
Payer: MEDICARE

## 2025-02-13 ENCOUNTER — APPOINTMENT (OUTPATIENT)
Dept: PHYSICAL THERAPY | Facility: MEDICAL CENTER | Age: 80
End: 2025-02-13
Attending: GENERAL PRACTICE
Payer: MEDICARE

## 2025-02-27 ENCOUNTER — PHYSICAL THERAPY (OUTPATIENT)
Dept: PHYSICAL THERAPY | Facility: MEDICAL CENTER | Age: 80
End: 2025-02-27
Attending: GENERAL PRACTICE
Payer: MEDICARE

## 2025-02-27 DIAGNOSIS — I67.9 SPASTIC HEMIPLEGIA OF RIGHT DOMINANT SIDE DUE TO CEREBROVASCULAR DISEASE (HCC): ICD-10-CM

## 2025-02-27 DIAGNOSIS — G81.11 SPASTIC HEMIPLEGIA OF RIGHT DOMINANT SIDE DUE TO CEREBROVASCULAR DISEASE (HCC): ICD-10-CM

## 2025-02-27 DIAGNOSIS — R26.9 NEUROLOGIC GAIT DISORDER: ICD-10-CM

## 2025-02-27 PROCEDURE — 97112 NEUROMUSCULAR REEDUCATION: CPT

## 2025-02-27 NOTE — OP THERAPY DAILY TREATMENT
"  Outpatient Physical Therapy  DAILY TREATMENT     [facility] Outpatient Physical Therapy  [address]  [place] [address]  Phone:  [phone]  Fax:  [phone]    Date: 02/27/2025    Patient: Abbi King  YOB: 1945  MRN: [id number]     Time Calculation    Start time: 1245  Stop time: 1330 Time Calculation (min): 45 minutes         Chief Complaint: Difficulty Walking    Visit #: 2    SUBJECTIVE:  Patient reports that she fell two days ago on the stairs; slid down one step while descending. Able to recover with Ken using railing. States that she has been working on standing and HEP.     OBJECTIVE:  Current objective measures:   Lower extremity (right):     Hip Flexion: 2+ in synergy pattern     Hip Abduction: 3+    Hip Extension:2 (alternate supine hip ext test)    Knee flexion: 2-    Knee extension: 3+    Ankle PF: 2 in supine (but unable in standing due to achilles tendon lengthening)    Balance:   Static standing          Therapeutic Exercises (CPT 10057):     20. UPOC 2/28    Therapeutic Treatments and Modalities:     1. Neuromuscular Re-education (CPT 40949)    Therapeutic Treatment and Modalities Summary: NMR:  -Assessment of isolated muscle strength; see above for details.   -Educated on \"returning to the basics\" to improve functional strength to translate to improved balance and gait.   -Established HEP to improve proximal stability:  1. Bridge with LLE straight to isolate R glute  2. Sidelying hip abduction   3. Sit to stand with LUE to assist: focus on equal balance     All exercises performed without KAFO.     Time-based treatments/modalities:    Physical Therapy Timed Treatment Charges  Neuromusc re-ed, balance, coor, post minutes (CPT 13884): 45 minutes        ASSESSMENT:   Response to treatment: Worked to establish HEP today to improve proximal hip strength. Will progress to standing activities next session if tolerated well. Did observe " decreased R knee thrust into extension during gait with KAFO today indicating good HEP compliance since last session.     PLAN/RECOMMENDATIONS:   Plan for treatment: therapy treatment to continue next visit. Standing therex. Balance, gait mechanics.   Planned interventions for next visit: continue with current treatment.

## 2025-03-06 ENCOUNTER — APPOINTMENT (OUTPATIENT)
Dept: PHYSICAL THERAPY | Facility: MEDICAL CENTER | Age: 80
End: 2025-03-06
Payer: MEDICARE

## 2025-03-13 ENCOUNTER — APPOINTMENT (OUTPATIENT)
Dept: PHYSICAL MEDICINE AND REHAB | Facility: MEDICAL CENTER | Age: 80
End: 2025-03-13
Payer: MEDICARE

## 2025-03-13 ENCOUNTER — APPOINTMENT (OUTPATIENT)
Dept: PHYSICAL THERAPY | Facility: MEDICAL CENTER | Age: 80
End: 2025-03-13
Payer: MEDICARE

## 2025-03-20 ENCOUNTER — PHYSICAL THERAPY (OUTPATIENT)
Dept: PHYSICAL THERAPY | Facility: MEDICAL CENTER | Age: 80
End: 2025-03-20
Attending: GENERAL PRACTICE
Payer: MEDICARE

## 2025-03-20 DIAGNOSIS — I67.9 SPASTIC HEMIPLEGIA OF RIGHT DOMINANT SIDE DUE TO CEREBROVASCULAR DISEASE (HCC): ICD-10-CM

## 2025-03-20 DIAGNOSIS — R26.9 NEUROLOGIC GAIT DISORDER: ICD-10-CM

## 2025-03-20 DIAGNOSIS — G81.11 SPASTIC HEMIPLEGIA OF RIGHT DOMINANT SIDE DUE TO CEREBROVASCULAR DISEASE (HCC): ICD-10-CM

## 2025-03-20 PROCEDURE — 97112 NEUROMUSCULAR REEDUCATION: CPT

## 2025-03-20 NOTE — OP THERAPY PROGRESS SUMMARY
Outpatient Physical Therapy  Progress NOTE      [facility] Outpatient Physical Therapy  [address]  [place] [address]  Phone:  [phone]  Fax:  [phone]    Date of Visit: 03/20/2025    Patient: Abbi King  YOB: 1945  MRN: [id number]     Referring Provider: Daniel Uriarte D.O.  [address]  [address]  [place] [address]   Referring Diagnosis Cramp and spasm [R25.2];Cerebrovascular disease, unspecified [I67.9];Spastic hemiplegia affecting right dominant side [G81.11];Unspecified abnormalities of gait and mobility [R26.9];Foot drop, right foot [M21.371];Wrist drop, right wrist [M21.331]     Visit Diagnoses     ICD-10-CM   1. Spastic hemiplegia of right dominant side due to cerebrovascular disease (HCC)  I67.9    G81.11   2. Neurologic gait disorder  R26.9       Rehab Potential: good    Progress Report Period: 1/30/25-3/20/25    Functional Assessment Used          Objective Findings and Assessment:   Patient progression towards goals: Patient has participated in 3 physical therapy sessions to address functional mobility impairments following chronic L CVAs. She has had minimal appointments since starting therapy due to scheduling issues, weather, etc. She has been provided with a HEP to improve RLE strength and is consistent with performance. She also demonstrates improved awareness of RLE midstance posture and can demonstrate decreased R knee thrust into hyperextension without KAFO. KAFO does not appear to fit patient correctly and is limiting gait mechanics. She would benefit from continued PT to address weakness and balance deficits as well as allow engagement with orthotist to discuss bracing. This will overall decrease her fall risk and improve her functional mobility.     Progress Toward Goals:  Short Term Goals:   1. Instruct in HEP Met, will continue to progress       Long Term Goals:    1. Independent with HEP.  Met, will continue to  progress   2. Perform STS transfer with equal weight bearing and no UE assist to indicate improved RLE strength. Partially met, improved equal weight bearing but continues to require LUE assist  3. Improve score on BBS to 41/56 or better to indicate a low risk for falling. Not met   4. Complete 10MWT with 0.95 m/sec gait speed or better with improved R knee control, LRAD, and appropriate orthotic. Not met       Goals:   Short Term Goals:   1. Instruct in HEP   2. Co-treat with orthotist to discuss bracing options.   Short term goal time span:  4-6 weeks      Long Term Goals:    1. Independent with HEP.   2. Perform STS transfer with equal weight bearing and no UE assist to indicate improved RLE strength.   3. Improve score on BBS to 41/56 or better to indicate a low risk for falling.   4. Complete 10MWT with 0.95 m/sec gait speed or better with improved R knee control, LRAD, and appropriate orthotic.     Long term goal time span:  2-4 months    Plan:   Planned therapy interventions:  Gait Training (CPT 21022), Neuromuscular Re-education (CPT 48146), Therapeutic Exercise (CPT 17714), Therapeutic Activities (CPT 02119) and Self Care ADL Training (CPT 14093)  Frequency:  1x week  Duration in weeks:  12      Referring provider co-signature:  I have reviewed this plan of care and my co-signature certifies the need for services.     Certification Period: 03/20/2025 to 06/05/25    Physician Signature: ________________________________ Date: ______________

## 2025-03-20 NOTE — OP THERAPY DAILY TREATMENT
Outpatient Physical Therapy  DAILY TREATMENT     [facility] Outpatient Physical Therapy  [address]  [place] [address]  Phone:  [phone]  Fax:  [phone]    Date: 03/20/2025    Patient: Abbi King  YOB: 1945  MRN: [id number]     Time Calculation    Start time: 1245  Stop time: 1332 Time Calculation (min): 47 minutes         Chief Complaint: Difficulty Walking    Visit #: 3    SUBJECTIVE:  Patient reports that she has been performing HEP but some activities have been increasing right knee pain. She has also noted increase in back pain. Overall doesn't feel like she has much energy today and was considering cancelling appointment.     Patient does report another fall on stairs since last session. She is in the process of buying a new home in [place] and will likely need to change location of PT once that happens. New home does not have stairs.     OBJECTIVE:  Current objective measures:           Therapeutic Exercises (CPT 32248):     20. UPOC 4/20      Therapeutic Exercise Summary: HEP: bridge with LLE straight, sidelying hip abduction, STS with LUE assist/equal weight and band around knees to encourage hip abduction, R midstance posture while performing marching with LLE    Therapeutic Treatments and Modalities:     1. Neuromuscular Re-education (CPT 28629)    Therapeutic Treatment and Modalities Summary: NMR:  -Review of previous HEP including   1. Bridge with LLE straight to isolate R glute (patient was performing on wrong side)  2. Sidelying hip abduction (good performance but poor motor control with descent)  3. Sit to stand with LUE to assist: focus on equal balance   -Due to STS causing increased R inferior/medial knee pain added orange TB around knees to encourage hip abduction activation. This decreased knee pain immediately but continued to struggle with weakness to perform activity in controlled fashion. Elevated surface and discussed how  to set up at home. Updated HEP.   -Encouraged patient to sleep with pillow between her knees to decrease back and knee pain at night.   -Static standing activities to encourage appropriate midstance posture including lateral weight shifting -> AP weight shifting with feet in staggered stance.   -Static standing with LUE support and performing L march while controlled R midstance posture. Good performance with supervision. Added to HEP.     All exercises performed without KAFO.     Time-based treatments/modalities:    Physical Therapy Timed Treatment Charges  Neuromusc re-ed, balance, coor, post minutes (CPT 34542): 47 minutes        ASSESSMENT:   Response to treatment: Patient has participated in 3 physical therapy sessions to address functional mobility impairments following chronic L CVAs. She has had minimal appointments since starting therapy due to scheduling issues, weather, etc. She has been provided with a HEP to improve RLE strength and is consistent with performance. She also demonstrates improved awareness of RLE midstance posture and can demonstrate decreased R knee thrust into hyperextension without KAFO. KAFO does not appear to fit patient correctly and is limiting gait mechanics. She would benefit from continued PT to address weakness and balance deficits as well as allow engagement with orthotist to discuss bracing. This will overall decrease her fall risk and improve her functional mobility.     PLAN/RECOMMENDATIONS:   Plan for treatment: therapy treatment to continue next visit. Initiate conversation with orthotist about new brace, review HEP, R knee control during gait, STS, stairs?   Planned interventions for next visit: continue with current treatment.

## 2025-03-26 ENCOUNTER — APPOINTMENT (OUTPATIENT)
Dept: PHYSICAL MEDICINE AND REHAB | Facility: MEDICAL CENTER | Age: 80
End: 2025-03-26
Payer: MEDICARE

## 2025-04-01 ENCOUNTER — APPOINTMENT (OUTPATIENT)
Dept: PHYSICAL MEDICINE AND REHAB | Facility: MEDICAL CENTER | Age: 80
End: 2025-04-01
Payer: MEDICARE

## 2025-07-30 ENCOUNTER — TELEPHONE (OUTPATIENT)
Dept: PHYSICAL THERAPY | Facility: MEDICAL CENTER | Age: 80
End: 2025-07-30
Payer: MEDICARE

## 2025-07-30 DIAGNOSIS — I67.9 SPASTIC HEMIPLEGIA OF RIGHT DOMINANT SIDE DUE TO CEREBROVASCULAR DISEASE (HCC): Primary | ICD-10-CM

## 2025-07-30 DIAGNOSIS — R26.9 NEUROLOGIC GAIT DISORDER: ICD-10-CM

## 2025-07-30 DIAGNOSIS — G81.11 SPASTIC HEMIPLEGIA OF RIGHT DOMINANT SIDE DUE TO CEREBROVASCULAR DISEASE (HCC): Primary | ICD-10-CM

## 2025-07-30 NOTE — OP THERAPY DISCHARGE SUMMARY
Outpatient Physical Therapy  DISCHARGE SUMMARY NOTE      [facility] Outpatient Physical Therapy  [address]  [place] [address]  Phone:  [phone]  Fax:  [phone]    Date of Visit: 07/30/2025    Patient: Abbi King  YOB: 1945  MRN: [id number]     Referring Provider: No referring provider defined for this encounter.   Referring Diagnosis No admission diagnoses are documented for this encounter.         Functional Assessment Used        Your patient is being discharged from Physical Therapy with the following comments:   Patient has failed to schedule or reschedule follow-up visits    Comments:  Patient completed 3 physical therapy sessions between 1/30/25- 3/20/25 to address functional mobility impairments following chronic L CVAs. She has not returned to services following last session. Discharging patient; she will need to obtain new referral if she would like to return to PT.        Miri Street, PT, DPT    Date: 7/30/2025

## (undated) DEVICE — TUBING CLEARLINK DUO-VENT - C-FLO (48EA/CA)

## (undated) DEVICE — KIT ANESTHESIA W/CIRCUIT & 3/LT BAG W/FILTER (20EA/CA)

## (undated) DEVICE — SUCTION INSTRUMENT YANKAUER BULBOUS TIP W/O VENT (50EA/CA)

## (undated) DEVICE — DRAPE 36X28IN RAD CARM BND BG - (25/CA) O

## (undated) DEVICE — ELECTRODE DUAL RETURN W/ CORD - (50/PK)

## (undated) DEVICE — SUTURE 3-0 VICRYL PLUS SH - 27 INCH (36/BX)

## (undated) DEVICE — DRESSING 3X8 ADAPTIC GAUZE - NON-ADHERING (36/PK 6PK/BX)

## (undated) DEVICE — GOWN WARMING STANDARD FLEX - (30/CA)

## (undated) DEVICE — GLOVE BIOGEL PI INDICATOR SZ 8.0 SURGICAL PF LF -(50/BX 4BX/CA)

## (undated) DEVICE — CANISTER SUCTION 3000ML MECHANICAL FILTER AUTO SHUTOFF MEDI-VAC NONSTERILE LF DISP  (40EA/CA)

## (undated) DEVICE — GLOVE SZ 7.5 BIOGEL PI MICRO - PF LF (50PR/BX)

## (undated) DEVICE — PACK LOWER EXTREMITY - (2/CA)

## (undated) DEVICE — SET LEADWIRE 5 LEAD BEDSIDE DISPOSABLE ECG (1SET OF 5/EA)

## (undated) DEVICE — SENSOR OXIMETER ADULT SPO2 RD SET (20EA/BX)

## (undated) DEVICE — GLOVE SZ 7 BIOGEL PI MICRO - PF LF (50PR/BX 4BX/CA)

## (undated) DEVICE — PADDING CAST 4 IN STERILE - 4 X 4 YDS (24/CA)

## (undated) DEVICE — MASK ANESTHESIA ADULT  - (100/CA)

## (undated) DEVICE — ELECTRODE 850 FOAM ADHESIVE - HYDROGEL RADIOTRNSPRNT (50/PK)

## (undated) DEVICE — GLOVE SZ 8 BIOGEL PI MICRO - PF LF (50PR/BX)

## (undated) DEVICE — SUTURE 2-0 VICRYL PLUS SH - 27 INCH (36/BX)

## (undated) DEVICE — BLADE SURGICAL #15 - (50/BX 3BX/CA)

## (undated) DEVICE — SODIUM CHL IRRIGATION 0.9% 1000ML (12EA/CA)

## (undated) DEVICE — PROTECTOR ULNA NERVE - (36PR/CA)

## (undated) DEVICE — SET EXTENSION WITH 2 PORTS (48EA/CA) ***PART #2C8610 IS A SUBSTITUTE*****

## (undated) DEVICE — GLOVE BIOGEL PI INDICATOR SZ 7.0 SURGICAL PF LF - (50/BX 4BX/CA)

## (undated) DEVICE — WRAP CO-FLEX 4IN X 5YD STERIL - SELF-ADHERENT (18/CA)

## (undated) DEVICE — GLOVE BIOGEL PI INDICATOR SZ 6.0 SURGICAL PF LF -(200PR/CA)

## (undated) DEVICE — GOWN SURGEONS X-LARGE - DISP. (30/CA)

## (undated) DEVICE — TOWEL STOP TIMEOUT SAFETY FLAG (40EA/CA)

## (undated) DEVICE — BANDAGE ELASTIC 4 IN X 5 YDS - LATEX FREE(10/BX 5BX/CA)

## (undated) DEVICE — SLEEVE, VASO, THIGH, MED

## (undated) DEVICE — LACTATED RINGERS INJ 1000 ML - (14EA/CA 60CA/PF)

## (undated) DEVICE — DRAPE LARGE 3 QUARTER - (20/CA)

## (undated) DEVICE — GLOVE BIOGEL PI INDICATOR SZ 7.5 SURGICAL PF LF -(50/BX 4BX/CA)

## (undated) DEVICE — HEAD HOLDER JUNIOR/ADULT

## (undated) DEVICE — SUTURE 3-0 ETHILON PS-1 (36PK/BX)

## (undated) DEVICE — TOURNIQUET, STERILE 24 (YELLOW)

## (undated) DEVICE — SUTURE GENERAL